# Patient Record
Sex: FEMALE | Race: AMERICAN INDIAN OR ALASKA NATIVE | NOT HISPANIC OR LATINO
[De-identification: names, ages, dates, MRNs, and addresses within clinical notes are randomized per-mention and may not be internally consistent; named-entity substitution may affect disease eponyms.]

---

## 2018-07-09 ENCOUNTER — RESULT REVIEW (OUTPATIENT)
Age: 58
End: 2018-07-09

## 2018-07-10 ENCOUNTER — RESULT REVIEW (OUTPATIENT)
Age: 58
End: 2018-07-10

## 2018-08-02 ENCOUNTER — OUTPATIENT (OUTPATIENT)
Dept: OUTPATIENT SERVICES | Facility: HOSPITAL | Age: 58
LOS: 1 days | End: 2018-08-02

## 2018-08-02 ENCOUNTER — APPOINTMENT (OUTPATIENT)
Dept: OBGYN | Facility: HOSPITAL | Age: 58
End: 2018-08-02

## 2018-08-27 ENCOUNTER — INPATIENT (INPATIENT)
Facility: HOSPITAL | Age: 58
LOS: 3 days | Discharge: HOME | End: 2018-08-31
Attending: INTERNAL MEDICINE | Admitting: INTERNAL MEDICINE
Payer: MEDICAID

## 2018-08-27 ENCOUNTER — APPOINTMENT (OUTPATIENT)
Dept: MRI IMAGING | Facility: IMAGING CENTER | Age: 58
End: 2018-08-27

## 2018-08-27 VITALS
SYSTOLIC BLOOD PRESSURE: 100 MMHG | TEMPERATURE: 101 F | HEART RATE: 114 BPM | RESPIRATION RATE: 26 BRPM | OXYGEN SATURATION: 96 % | DIASTOLIC BLOOD PRESSURE: 68 MMHG

## 2018-08-27 DIAGNOSIS — A41.9 SEPSIS, UNSPECIFIED ORGANISM: ICD-10-CM

## 2018-08-27 DIAGNOSIS — N17.9 ACUTE KIDNEY FAILURE, UNSPECIFIED: ICD-10-CM

## 2018-08-27 DIAGNOSIS — E03.9 HYPOTHYROIDISM, UNSPECIFIED: ICD-10-CM

## 2018-08-27 DIAGNOSIS — Z29.9 ENCOUNTER FOR PROPHYLACTIC MEASURES, UNSPECIFIED: ICD-10-CM

## 2018-08-27 DIAGNOSIS — I10 ESSENTIAL (PRIMARY) HYPERTENSION: ICD-10-CM

## 2018-08-27 DIAGNOSIS — N12 TUBULO-INTERSTITIAL NEPHRITIS, NOT SPECIFIED AS ACUTE OR CHRONIC: ICD-10-CM

## 2018-08-27 DIAGNOSIS — N63.0 UNSPECIFIED LUMP IN UNSPECIFIED BREAST: ICD-10-CM

## 2018-08-27 DIAGNOSIS — E87.6 HYPOKALEMIA: ICD-10-CM

## 2018-08-27 DIAGNOSIS — M06.9 RHEUMATOID ARTHRITIS, UNSPECIFIED: ICD-10-CM

## 2018-08-27 LAB
ALBUMIN SERPL ELPH-MCNC: 3.4 G/DL — SIGNIFICANT CHANGE UP (ref 3.3–5)
ALP SERPL-CCNC: 95 U/L — SIGNIFICANT CHANGE UP (ref 40–120)
ALT FLD-CCNC: 23 U/L — SIGNIFICANT CHANGE UP (ref 4–33)
ANISOCYTOSIS BLD QL: SLIGHT — SIGNIFICANT CHANGE UP
APPEARANCE UR: SIGNIFICANT CHANGE UP
AST SERPL-CCNC: 55 U/L — HIGH (ref 4–32)
BACTERIA # UR AUTO: NEGATIVE — SIGNIFICANT CHANGE UP
BASE EXCESS BLDV CALC-SCNC: -0.7 MMOL/L — SIGNIFICANT CHANGE UP
BASE EXCESS BLDV CALC-SCNC: -1.6 MMOL/L — SIGNIFICANT CHANGE UP
BASOPHILS # BLD AUTO: 0.06 K/UL — SIGNIFICANT CHANGE UP (ref 0–0.2)
BASOPHILS NFR BLD AUTO: 0.3 % — SIGNIFICANT CHANGE UP (ref 0–2)
BASOPHILS NFR SPEC: 0 % — SIGNIFICANT CHANGE UP (ref 0–2)
BILIRUB SERPL-MCNC: 1.7 MG/DL — HIGH (ref 0.2–1.2)
BILIRUB UR-MCNC: NEGATIVE — SIGNIFICANT CHANGE UP
BLASTS # FLD: 0 % — SIGNIFICANT CHANGE UP (ref 0–0)
BLOOD GAS VENOUS - CREATININE: 2.83 MG/DL — HIGH (ref 0.5–1.3)
BLOOD GAS VENOUS - CREATININE: 2.93 MG/DL — HIGH (ref 0.5–1.3)
BLOOD UR QL VISUAL: HIGH
BUN SERPL-MCNC: 37 MG/DL — HIGH (ref 7–23)
BURR CELLS BLD QL SMEAR: SLIGHT — SIGNIFICANT CHANGE UP
CALCIUM SERPL-MCNC: 8.6 MG/DL — SIGNIFICANT CHANGE UP (ref 8.4–10.5)
CHLORIDE BLDV-SCNC: 103 MMOL/L — SIGNIFICANT CHANGE UP (ref 96–108)
CHLORIDE BLDV-SCNC: 98 MMOL/L — SIGNIFICANT CHANGE UP (ref 96–108)
CHLORIDE SERPL-SCNC: 88 MMOL/L — LOW (ref 98–107)
CO2 SERPL-SCNC: 21 MMOL/L — LOW (ref 22–31)
COLOR SPEC: YELLOW — SIGNIFICANT CHANGE UP
CREAT SERPL-MCNC: 2.79 MG/DL — HIGH (ref 0.5–1.3)
EOSINOPHIL # BLD AUTO: 0.01 K/UL — SIGNIFICANT CHANGE UP (ref 0–0.5)
EOSINOPHIL NFR BLD AUTO: 0 % — SIGNIFICANT CHANGE UP (ref 0–6)
EOSINOPHIL NFR FLD: 0.8 % — SIGNIFICANT CHANGE UP (ref 0–6)
GAS PNL BLDV: 127 MMOL/L — LOW (ref 136–146)
GAS PNL BLDV: 129 MMOL/L — LOW (ref 136–146)
GIANT PLATELETS BLD QL SMEAR: PRESENT — SIGNIFICANT CHANGE UP
GLUCOSE BLDV-MCNC: 115 — HIGH (ref 70–99)
GLUCOSE BLDV-MCNC: 122 — HIGH (ref 70–99)
GLUCOSE SERPL-MCNC: 111 MG/DL — HIGH (ref 70–99)
GLUCOSE UR-MCNC: NEGATIVE — SIGNIFICANT CHANGE UP
HCO3 BLDV-SCNC: 22 MMOL/L — SIGNIFICANT CHANGE UP (ref 20–27)
HCO3 BLDV-SCNC: 23 MMOL/L — SIGNIFICANT CHANGE UP (ref 20–27)
HCT VFR BLD CALC: 37.5 % — SIGNIFICANT CHANGE UP (ref 34.5–45)
HCT VFR BLDV CALC: 32.4 % — LOW (ref 34.5–45)
HCT VFR BLDV CALC: 39.2 % — SIGNIFICANT CHANGE UP (ref 34.5–45)
HGB BLD-MCNC: 12.8 G/DL — SIGNIFICANT CHANGE UP (ref 11.5–15.5)
HGB BLDV-MCNC: 10.5 G/DL — LOW (ref 11.5–15.5)
HGB BLDV-MCNC: 12.8 G/DL — SIGNIFICANT CHANGE UP (ref 11.5–15.5)
HYALINE CASTS # UR AUTO: SIGNIFICANT CHANGE UP
IMM GRANULOCYTES # BLD AUTO: 0.2 # — SIGNIFICANT CHANGE UP
IMM GRANULOCYTES NFR BLD AUTO: 0.9 % — SIGNIFICANT CHANGE UP (ref 0–1.5)
KETONES UR-MCNC: NEGATIVE — SIGNIFICANT CHANGE UP
LACTATE BLDV-MCNC: 1.9 MMOL/L — SIGNIFICANT CHANGE UP (ref 0.5–2)
LACTATE BLDV-MCNC: 2.4 MMOL/L — HIGH (ref 0.5–2)
LEUKOCYTE ESTERASE UR-ACNC: SIGNIFICANT CHANGE UP
LYMPHOCYTES # BLD AUTO: 1.14 K/UL — SIGNIFICANT CHANGE UP (ref 1–3.3)
LYMPHOCYTES # BLD AUTO: 4.9 % — LOW (ref 13–44)
LYMPHOCYTES NFR SPEC AUTO: 2.6 % — LOW (ref 13–44)
MCHC RBC-ENTMCNC: 30.6 PG — SIGNIFICANT CHANGE UP (ref 27–34)
MCHC RBC-ENTMCNC: 34.1 % — SIGNIFICANT CHANGE UP (ref 32–36)
MCV RBC AUTO: 89.7 FL — SIGNIFICANT CHANGE UP (ref 80–100)
METAMYELOCYTES # FLD: 0 % — SIGNIFICANT CHANGE UP (ref 0–1)
MONOCYTES # BLD AUTO: 0.66 K/UL — SIGNIFICANT CHANGE UP (ref 0–0.9)
MONOCYTES NFR BLD AUTO: 2.8 % — SIGNIFICANT CHANGE UP (ref 2–14)
MONOCYTES NFR BLD: 2.6 % — SIGNIFICANT CHANGE UP (ref 2–9)
MYELOCYTES NFR BLD: 0 % — SIGNIFICANT CHANGE UP (ref 0–0)
NEUTROPHIL AB SER-ACNC: 88.9 % — HIGH (ref 43–77)
NEUTROPHILS # BLD AUTO: 21.1 K/UL — HIGH (ref 1.8–7.4)
NEUTROPHILS NFR BLD AUTO: 91.1 % — HIGH (ref 43–77)
NEUTS BAND # BLD: 5.1 % — SIGNIFICANT CHANGE UP (ref 0–6)
NITRITE UR-MCNC: NEGATIVE — SIGNIFICANT CHANGE UP
NRBC # FLD: 0 — SIGNIFICANT CHANGE UP
OTHER - HEMATOLOGY %: 0 — SIGNIFICANT CHANGE UP
PCO2 BLDV: 37 MMHG — LOW (ref 41–51)
PCO2 BLDV: 39 MMHG — LOW (ref 41–51)
PH BLDV: 7.38 PH — SIGNIFICANT CHANGE UP (ref 7.32–7.43)
PH BLDV: 7.41 PH — SIGNIFICANT CHANGE UP (ref 7.32–7.43)
PH UR: 6 — SIGNIFICANT CHANGE UP (ref 5–8)
PLATELET # BLD AUTO: 170 K/UL — SIGNIFICANT CHANGE UP (ref 150–400)
PLATELET COUNT - ESTIMATE: NORMAL — SIGNIFICANT CHANGE UP
PMV BLD: 10.8 FL — SIGNIFICANT CHANGE UP (ref 7–13)
PO2 BLDV: 19 MMHG — LOW (ref 35–40)
PO2 BLDV: 31 MMHG — LOW (ref 35–40)
POIKILOCYTOSIS BLD QL AUTO: SLIGHT — SIGNIFICANT CHANGE UP
POTASSIUM BLDV-SCNC: 2.6 MMOL/L — CRITICAL LOW (ref 3.4–4.5)
POTASSIUM BLDV-SCNC: 3.2 MMOL/L — LOW (ref 3.4–4.5)
POTASSIUM SERPL-MCNC: 3.5 MMOL/L — SIGNIFICANT CHANGE UP (ref 3.5–5.3)
POTASSIUM SERPL-SCNC: 3.5 MMOL/L — SIGNIFICANT CHANGE UP (ref 3.5–5.3)
PROMYELOCYTES # FLD: 0 % — SIGNIFICANT CHANGE UP (ref 0–0)
PROT SERPL-MCNC: 7.3 G/DL — SIGNIFICANT CHANGE UP (ref 6–8.3)
PROT UR-MCNC: HIGH
RBC # BLD: 4.18 M/UL — SIGNIFICANT CHANGE UP (ref 3.8–5.2)
RBC # FLD: 13.6 % — SIGNIFICANT CHANGE UP (ref 10.3–14.5)
RBC CASTS # UR COMP ASSIST: SIGNIFICANT CHANGE UP (ref 0–?)
REVIEW TO FOLLOW: YES — SIGNIFICANT CHANGE UP
SAO2 % BLDV: 25.5 % — LOW (ref 60–85)
SAO2 % BLDV: 51.6 % — LOW (ref 60–85)
SODIUM SERPL-SCNC: 131 MMOL/L — LOW (ref 135–145)
SP GR SPEC: 1.02 — SIGNIFICANT CHANGE UP (ref 1–1.04)
SQUAMOUS # UR AUTO: SIGNIFICANT CHANGE UP
TSH SERPL-MCNC: 4.06 UIU/ML — SIGNIFICANT CHANGE UP (ref 0.27–4.2)
UROBILINOGEN FLD QL: NORMAL — SIGNIFICANT CHANGE UP
VARIANT LYMPHS # BLD: 0 % — SIGNIFICANT CHANGE UP
WBC # BLD: 23.17 K/UL — HIGH (ref 3.8–10.5)
WBC # FLD AUTO: 23.17 K/UL — HIGH (ref 3.8–10.5)
WBC UR QL: >50 — HIGH (ref 0–?)

## 2018-08-27 PROCEDURE — 71045 X-RAY EXAM CHEST 1 VIEW: CPT | Mod: 26

## 2018-08-27 PROCEDURE — 74176 CT ABD & PELVIS W/O CONTRAST: CPT | Mod: 26

## 2018-08-27 PROCEDURE — 99223 1ST HOSP IP/OBS HIGH 75: CPT | Mod: GC

## 2018-08-27 RX ORDER — POTASSIUM CHLORIDE 20 MEQ
40 PACKET (EA) ORAL ONCE
Qty: 0 | Refills: 0 | Status: COMPLETED | OUTPATIENT
Start: 2018-08-27 | End: 2018-08-27

## 2018-08-27 RX ORDER — LOSARTAN POTASSIUM 100 MG/1
50 TABLET, FILM COATED ORAL DAILY
Qty: 0 | Refills: 0 | Status: DISCONTINUED | OUTPATIENT
Start: 2018-08-27 | End: 2018-08-27

## 2018-08-27 RX ORDER — LEVOTHYROXINE SODIUM 125 MCG
25 TABLET ORAL DAILY
Qty: 0 | Refills: 0 | Status: DISCONTINUED | OUTPATIENT
Start: 2018-08-27 | End: 2018-08-31

## 2018-08-27 RX ORDER — ERGOCALCIFEROL 1.25 MG/1
50000 CAPSULE ORAL
Qty: 0 | Refills: 0 | Status: DISCONTINUED | OUTPATIENT
Start: 2018-08-27 | End: 2018-08-28

## 2018-08-27 RX ORDER — SODIUM CHLORIDE 9 MG/ML
1000 INJECTION, SOLUTION INTRAVENOUS ONCE
Qty: 0 | Refills: 0 | Status: COMPLETED | OUTPATIENT
Start: 2018-08-27 | End: 2018-08-27

## 2018-08-27 RX ORDER — LEVOTHYROXINE SODIUM 125 MCG
1 TABLET ORAL
Qty: 0 | Refills: 0 | COMMUNITY

## 2018-08-27 RX ORDER — SODIUM CHLORIDE 9 MG/ML
1000 INJECTION INTRAMUSCULAR; INTRAVENOUS; SUBCUTANEOUS
Qty: 0 | Refills: 0 | Status: DISCONTINUED | OUTPATIENT
Start: 2018-08-27 | End: 2018-08-31

## 2018-08-27 RX ORDER — HEPARIN SODIUM 5000 [USP'U]/ML
5000 INJECTION INTRAVENOUS; SUBCUTANEOUS EVERY 8 HOURS
Qty: 0 | Refills: 0 | Status: DISCONTINUED | OUTPATIENT
Start: 2018-08-27 | End: 2018-08-31

## 2018-08-27 RX ORDER — ACETAMINOPHEN 500 MG
975 TABLET ORAL ONCE
Qty: 0 | Refills: 0 | Status: COMPLETED | OUTPATIENT
Start: 2018-08-27 | End: 2018-08-27

## 2018-08-27 RX ORDER — VANCOMYCIN HCL 1 G
1000 VIAL (EA) INTRAVENOUS ONCE
Qty: 0 | Refills: 0 | Status: COMPLETED | OUTPATIENT
Start: 2018-08-27 | End: 2018-08-27

## 2018-08-27 RX ORDER — ACETAMINOPHEN 500 MG
650 TABLET ORAL ONCE
Qty: 0 | Refills: 0 | Status: COMPLETED | OUTPATIENT
Start: 2018-08-27 | End: 2018-08-27

## 2018-08-27 RX ORDER — ACETAMINOPHEN 500 MG
650 TABLET ORAL EVERY 6 HOURS
Qty: 0 | Refills: 0 | Status: DISCONTINUED | OUTPATIENT
Start: 2018-08-27 | End: 2018-08-31

## 2018-08-27 RX ORDER — PIPERACILLIN AND TAZOBACTAM 4; .5 G/20ML; G/20ML
3.38 INJECTION, POWDER, LYOPHILIZED, FOR SOLUTION INTRAVENOUS ONCE
Qty: 0 | Refills: 0 | Status: COMPLETED | OUTPATIENT
Start: 2018-08-27 | End: 2018-08-27

## 2018-08-27 RX ORDER — SODIUM CHLORIDE 9 MG/ML
1000 INJECTION INTRAMUSCULAR; INTRAVENOUS; SUBCUTANEOUS ONCE
Qty: 0 | Refills: 0 | Status: COMPLETED | OUTPATIENT
Start: 2018-08-27 | End: 2018-08-27

## 2018-08-27 RX ORDER — POTASSIUM CHLORIDE 20 MEQ
10 PACKET (EA) ORAL ONCE
Qty: 0 | Refills: 0 | Status: COMPLETED | OUTPATIENT
Start: 2018-08-27 | End: 2018-08-27

## 2018-08-27 RX ORDER — HYDROCHLOROTHIAZIDE 25 MG
12.5 TABLET ORAL DAILY
Qty: 0 | Refills: 0 | Status: DISCONTINUED | OUTPATIENT
Start: 2018-08-27 | End: 2018-08-27

## 2018-08-27 RX ORDER — ERGOCALCIFEROL 1.25 MG/1
1 CAPSULE ORAL
Qty: 0 | Refills: 0 | COMMUNITY

## 2018-08-27 RX ORDER — IPRATROPIUM/ALBUTEROL SULFATE 18-103MCG
3 AEROSOL WITH ADAPTER (GRAM) INHALATION ONCE
Qty: 0 | Refills: 0 | Status: COMPLETED | OUTPATIENT
Start: 2018-08-27 | End: 2018-08-27

## 2018-08-27 RX ADMIN — Medication 100 MILLIEQUIVALENT(S): at 21:01

## 2018-08-27 RX ADMIN — SODIUM CHLORIDE 1000 MILLILITER(S): 9 INJECTION INTRAMUSCULAR; INTRAVENOUS; SUBCUTANEOUS at 14:30

## 2018-08-27 RX ADMIN — Medication 650 MILLIGRAM(S): at 21:01

## 2018-08-27 RX ADMIN — HEPARIN SODIUM 5000 UNIT(S): 5000 INJECTION INTRAVENOUS; SUBCUTANEOUS at 23:39

## 2018-08-27 RX ADMIN — Medication 40 MILLIEQUIVALENT(S): at 21:01

## 2018-08-27 RX ADMIN — Medication 3 MILLILITER(S): at 14:30

## 2018-08-27 RX ADMIN — SODIUM CHLORIDE 1000 MILLILITER(S): 9 INJECTION, SOLUTION INTRAVENOUS at 19:02

## 2018-08-27 RX ADMIN — PIPERACILLIN AND TAZOBACTAM 3.38 GRAM(S): 4; .5 INJECTION, POWDER, LYOPHILIZED, FOR SOLUTION INTRAVENOUS at 15:00

## 2018-08-27 RX ADMIN — Medication 1000 MILLIGRAM(S): at 16:20

## 2018-08-27 RX ADMIN — Medication 650 MILLIGRAM(S): at 22:02

## 2018-08-27 RX ADMIN — PIPERACILLIN AND TAZOBACTAM 200 GRAM(S): 4; .5 INJECTION, POWDER, LYOPHILIZED, FOR SOLUTION INTRAVENOUS at 14:30

## 2018-08-27 RX ADMIN — SODIUM CHLORIDE 1000 MILLILITER(S): 9 INJECTION INTRAMUSCULAR; INTRAVENOUS; SUBCUTANEOUS at 15:47

## 2018-08-27 RX ADMIN — Medication 975 MILLIGRAM(S): at 15:20

## 2018-08-27 RX ADMIN — SODIUM CHLORIDE 100 MILLILITER(S): 9 INJECTION INTRAMUSCULAR; INTRAVENOUS; SUBCUTANEOUS at 19:01

## 2018-08-27 RX ADMIN — SODIUM CHLORIDE 1000 MILLILITER(S): 9 INJECTION, SOLUTION INTRAVENOUS at 15:47

## 2018-08-27 RX ADMIN — Medication 250 MILLIGRAM(S): at 15:20

## 2018-08-27 NOTE — H&P ADULT - PROBLEM SELECTOR PLAN 1
tachypneic, tachycardic, leukocytosis, febrile, pyelo as source.   -s/p Vanc/Zosyn and fluids in the ED  -U/A positive, CT suspicious of pyelo   -will start Ceftriaxone  -f/u urine and blood clxs  -c/w maintenance fluids @ 100cc/hr tachypneic, tachycardic, leukocytosis, febrile, pyelo as source.   -s/p Vanc/Zosyn and fluids in the ED  -U/A positive, CT suspicious of pyelo   -c/w Zosyn renal dosing for now, may need to switch if Cr improves  -f/u urine and blood clxs  -c/w maintenance fluids @ 100cc/hr tachypneic, tachycardic, leukocytosis, febrile, CT A/P suspicious for Rt pyelonephritis  -s/p Vanc/Zosyn and fluids in the ED  -U/A positive, CT suspicious of pyelo   -c/w Zosyn renal dosing for now, may need increase dosage once renal function improves  -f/u urine and blood clxs  -c/w maintenance fluids @ 100cc/hr

## 2018-08-27 NOTE — H&P ADULT - PROBLEM SELECTOR PLAN 8
Pt seen by Dr. Marlena Rubi for follow up. Pt reports that she was getting MRI done today, but could not complete the study today.  -call Dr. Rubi and see if she can get MRI done as inpt to facilitate treatment once she leaves hospital -repeating electrolytes  -f/u on BMPs  -check Magnesium

## 2018-08-27 NOTE — H&P ADULT - PROBLEM SELECTOR PLAN 5
-will continue Synthroid 25mcg QD home dose  -TSH in AM pt takes Methotrexate once a week as outpt.  -will hold Methotrexate while pt hospitalized pt takes Methotrexate once a week as outpt.  -will hold Methotrexate while pt hospitalized  -Clarify the pt home medications, especially the dose of Methotrexate in AM (primary team)

## 2018-08-27 NOTE — ED PROVIDER NOTE - PROGRESS NOTE DETAILS
MILLY Llanes: Spoke with pts PMD , pt to be admitted to the hospitalist. Discussed admission with patient who agrees. Pt was at breast imaging center this morning, she already knows of left breast mass. MILLY Llanes: Spoke with hospitalist  who accepts this pt to her service, MAR text paged

## 2018-08-27 NOTE — H&P ADULT - NSHPPHYSICALEXAM_GEN_ALL_CORE
T(C): 37.2 (08-27-18 @ 17:53), Max: 38.4 (08-27-18 @ 13:48)  HR: 74 (08-27-18 @ 19:01) (74 - 114)  BP: 128/71 (08-27-18 @ 19:01) (93/39 - 128/71)  RR: 18 (08-27-18 @ 19:01) (17 - 26)  SpO2: 98% (08-27-18 @ 19:01) (96% - 98%)    GENERAL: NAD, well-developed  HEAD:  Atraumatic, Normocephalic  EYES: slightly icteric eyes, otherwise normal  NECK: Supple, No JVD  CHEST/LUNG: Clear to auscultation bilaterally; No wheeze  HEART: Regular rate and rhythm; No murmurs, rubs, or gallops  ABDOMEN: R CVA tenderness. tenderness to deep palpation of R abd. pain on deep palpation of epigastric area. bowel sounds positive  EXTREMITIES:  2+ Peripheral Pulses, No clubbing, cyanosis, or edema  PSYCH: AAOx3  NEUROLOGY: non-focal  SKIN: No rashes or lesions T(C): 37.2 (08-27-18 @ 17:53), Max: 38.4 (08-27-18 @ 13:48)  HR: 74 (08-27-18 @ 19:01) (74 - 114)  BP: 128/71 (08-27-18 @ 19:01) (93/39 - 128/71)  RR: 18 (08-27-18 @ 19:01) (17 - 26)  SpO2: 98% (08-27-18 @ 19:01) (96% - 98%)    GENERAL: NAD, well-developed  HEAD:  Atraumatic, Normocephalic  NECK: Supple, No JVD  CHEST/LUNG: Clear to auscultation bilaterally; No wheeze  HEART: Regular rate and rhythm; No murmurs, rubs, or gallops  ABDOMEN: R CVA tenderness to perc. tenderness to deep palpation of Rt upper abd. qdrnt, pain on deep palpation of epigastric area. bowel sounds positive  EXTREMITIES:  2+ Peripheral Pulses, No clubbing, cyanosis, or edema  SKIN: No rashes or lesions

## 2018-08-27 NOTE — H&P ADULT - PROBLEM SELECTOR PLAN 2
-infectious treatment as above  -will consider switching to oral agent once uclx return CT  A/P suspicious for pyelonephritis  -infectious treatment as above  -will consider switching to oral agent once uclx return

## 2018-08-27 NOTE — H&P ADULT - NSHPLABSRESULTS_GEN_ALL_CORE
12.8   23.17 )-----------( 170      ( 27 Aug 2018 14:26 )             37.5           131<L>  |  88<L>  |  37<H>  ----------------------------<  111<H>  3.5   |  21<L>  |  2.79<H>    Ca    8.6      27 Aug 2018 14:26    TPro  7.3  /  Alb  3.4  /  TBili  1.7<H>  /  DBili  x   /  AST  55<H>  /  ALT  23  /  AlkPhos  95                Urinalysis Basic - ( 27 Aug 2018 16:30 )    Color: YELLOW / Appearance: Lt TURBID / S.018 / pH: 6.0  Gluc: NEGATIVE / Ketone: NEGATIVE  / Bili: NEGATIVE / Urobili: NORMAL   Blood: MODERATE / Protein: MODERATE / Nitrite: NEGATIVE   Leuk Esterase: LARGE / RBC: 3-5 / WBC >50   Sq Epi: FEW / Non Sq Epi: x / Bacteria: NEGATIVE    Lactate Trend  Blood Gas Venous - Lactate: 2.4: Please note updated reference range. mmol/L (18 @ 14:26)  Blood Gas Venous - Lactate: 1.9: Please note updated reference range. mmol/L (18 @ 19:00)    < from: CT Abdomen and Pelvis No Cont (18 @ 16:03) >    IMPRESSION:   Enlarged heterogeneous appearing right kidney with urothelial thickening   and mild perinephric stranding, suspicious for pyelonephritis. Underlying   infiltrative process cannot be excluded. No obstructing urinary calculi   bilaterally.    Moderate age indeterminate compression deformity of L1 is new from   2016.    Solid spiculated mass in the lateral left breast, suspicious for   neoplasm. Correlation with mammography is suggested.    < end of copied text >    < from: Xray Chest 1 View-PORTABLE IMMEDIATE (18 @ 16:00) >    IMPRESSION:  Lungs underinflated but otherwise clear. No pleural effusions or   pneumothorax.    Stable cardiac and mediastinal silhouettes.    Trachea projects to left of midline but proportionate to degree of   patient rotation.    Severe right shoulder degenerative disease with exuberant surrounding   increased ossification and advanced left shoulder degenerative disease   again noted.    < end of copied text > CT ABDOMEN AND PELVIS    PROCEDURE DATE:  Aug 27 2018   LOWER CHEST: Bibasilar subsegmental atelectasis. A solid spiculated mass   in the lateral left breast measures 2.7 x 1.9 cm (2:7) and has an   associated coarse calcification.  LIVER: Within normal limits.  BILE DUCTS: Normal caliber.  GALLBLADDER: Within normal limits.  SPLEEN: Within normal limits.  PANCREAS: Within normal limits.  ADRENALS: Within normal limits.  KIDNEYS/URETERS: Enlarged/edematous heterogeneous appearing right kidney   with perinephric/periureteral fat stranding and hyperdensity of the right   ureteral wall. No obstructing right ureteral calculus. No left   hydronephrosis.   BLADDER: Within normal limits.  REPRODUCTIVE ORGANS: No adnexal masses. Status post hysterectomy.  BOWEL: No bowel obstruction.   PERITONEUM: No ascites.  VESSELS:  Within normal limits.  RETROPERITONEUM: No lymphadenopathy.    ABDOMINAL WALL: Within normal limits.  BONES: Moderate age indeterminate compression deformity of L1 is new from   8/8/2016. No bony retropulsion. Degenerative changes.  IMPRESSION:   Enlarged heterogeneous appearing right kidney with urothelial thickening   and mild perinephric stranding, suspicious for pyelonephritis. Underlying   infiltrative process cannot be excluded. No obstructing urinary calculi   bilaterally.    Moderate age indeterminate compression deformity of L1 is new from   8/8/2016.    Solid spiculated mass in the lateral left breast, suspicious for   neoplasm. Correlation with mammography is suggested.

## 2018-08-27 NOTE — ED ADULT NURSE NOTE - NSIMPLEMENTINTERV_GEN_ALL_ED
Implemented All Universal Safety Interventions:  [place] to call system. Call bell, personal items and telephone within reach. Instruct patient to call for assistance. Room bathroom lighting operational. Non-slip footwear when patient is off stretcher. Physically safe environment: no spills, clutter or unnecessary equipment. Stretcher in lowest position, wheels locked, appropriate side rails in place.

## 2018-08-27 NOTE — H&P ADULT - LYMPHATIC
anterior cervical R/supraclavicular L/posterior cervical L/anterior cervical L/posterior cervical R/supraclavicular R

## 2018-08-27 NOTE — ED PROVIDER NOTE - ATTENDING CONTRIBUTION TO CARE
Dr. Wilks:  I performed a face to face bedside interview with patient regarding history of present illness, review of symptoms and past medical history. I completed an independent physical exam.  I have discussed patient's plan of care with PA.   I agree with note as stated above, having amended the EMR as needed to reflect my findings.   This includes HISTORY OF PRESENT ILLNESS, HIV, PAST MEDICAL/SURGICAL/FAMILY/SOCIAL HISTORY, ALLERGIES AND HOME MEDICATIONS, REVIEW OF SYSTEMS, PHYSICAL EXAM, and any PROGRESS NOTES during the time I functioned as the attending physician for this patient.    58F h/o RA on methotrexate, HTN, hypothyroidism, presents with 2 days of fever with low back pain, abd pain, dysuria and urinary frequency.  Also c/o CP/SOB last night.  Was started on antibiotics yesterday by PCP but not sure which and why.  Returned to see PCP today and sent to ED for low BP and fever.  Reportedly BP 70's systolic.  Endorses Nausea.      Exam:  - appears uncomfortable  - rrr  - ctab  - abd soft, non tender, +r flank mild tenderness    A/P  - Dr. Wilks:  I performed a face to face bedside interview with patient regarding history of present illness, review of symptoms and past medical history. I completed an independent physical exam.  I have discussed patient's plan of care with PA.   I agree with note as stated above, having amended the EMR as needed to reflect my findings.   This includes HISTORY OF PRESENT ILLNESS, HIV, PAST MEDICAL/SURGICAL/FAMILY/SOCIAL HISTORY, ALLERGIES AND HOME MEDICATIONS, REVIEW OF SYSTEMS, PHYSICAL EXAM, and any PROGRESS NOTES during the time I functioned as the attending physician for this patient.    58F h/o RA on methotrexate, HTN, hypothyroidism, presents with 2 days of fever with low back pain, abd pain, dysuria and urinary frequency.  Also c/o CP/SOB last night.  Was started on antibiotics yesterday by PCP but not sure which and why.  Returned to see PCP today and sent to ED for low BP and fever.  Reportedly BP 70's systolic.  Endorses Nausea.      Exam:  - appears uncomfortable  - rrr  - ctab  - abd soft, ?TTP R abdomen, +r flank mild tenderness    A/P  - sepsis, eval source  - cbc, cmp, vbg, coags, ua, urine culture, blood cultures  - ct abd/pelvis  - CXR

## 2018-08-27 NOTE — H&P ADULT - PROBLEM SELECTOR PLAN 3
Pt takes Telmisartan and Chlorthalidone at home  - will hold antihypertensives for now as pt was hypotensive in the ED CT A/P significant for LIVER: Within normal limits. BILE DUCTS: Normal caliber.  GALLBLADDER: Within normal limits.  -Likely symptoms present due to Rt kidney infection rather than gallbladder problem given CT findings   -Monitor

## 2018-08-27 NOTE — ED PROVIDER NOTE - MEDICAL DECISION MAKING DETAILS
58yF w/pmhx HTN, RA on methotrexate, hypothyroid p/w fever, abd pain, low back pain and dysuria x 2 days. Sent in by her PMD, had been started on abx yesterday? Pt tachy and febrile. Will give fluids and abx, cbc/cmp/vbg, ua and culture, chest xray and CT abd/pelvis.

## 2018-08-27 NOTE — ED PROVIDER NOTE - OBJECTIVE STATEMENT
58yF w/pmhx RA on methotrexate, HTN, hypothyroid presenting with fever, abdominal pain and dysuria x 2 days. Pts daughter is translating at bedside per pts request. Pt states she had urinary frequency and dysuria followed by low back pain, abdominal pain and fever that started 2 days ago. Pt endorses nausea, no vomiting or diarrhea. She saw her PMD yesterday who started her on antibiotics (for unknown reason). Last night she developed chest pain with shortness of breath. Today she was at her doctors office and was sent to the ED with fever for further evaluation. EMS gave pt 2 duonebs, her pressure was 72/58. Pt denies palpitations, vomiting, diarrhea, sick contacts, recent travel, numbness/tingling, bowel or bladder incontinence or any other concerns.

## 2018-08-27 NOTE — ED CLERICAL - NS ED CLERK NOTE PRE-ARRIVAL INFORMATION; PCP CONTACT INFORMATION
pt was at [facility] for a breast biopsy but was found to have a temp of 103 BP 98 systolic O2 sat 92% creat 3

## 2018-08-27 NOTE — H&P ADULT - NSHPSOCIALHISTORY_GEN_ALL_CORE
pt retired, used to work as head of a school. lives at home with family.  denies toxic habits pt retired, used to work as head of a school.  lives at home with family.  reports no tobacco, alcohol ir illicit drug use

## 2018-08-27 NOTE — H&P ADULT - PROBLEM SELECTOR PLAN 4
pt takes Methotrexate once a week as outpt.  -will hold Methotrexate while pt hospitalized Pt takes Telmisartan and Chlorthalidone at home  - will hold antihypertensives for now as pt was hypotensive in the ED

## 2018-08-27 NOTE — H&P ADULT - HISTORY OF PRESENT ILLNESS
58F pmh RA on Methotrexate, HTN, Hypothyroidism, L breast lump being worked up presents to ED w/ 3 day history of pain on urination, R flank pain, fevers, chills. Pt's daughter translated at bedside as pt refused  services. Pt reports that as of 3 days ago, she has been feeling ill. She started with having burning on urination along with chills and subjective fevers. Pt reports that the next day, she started to feel back pain, squeezing in nature, 7/10, better with rest, worse with movement, that radiates to the R lower abdomen. Pt went to PCP's office yesterday who prescribed an abx and gave the pt B12. On day of admission, pt was not feeling much better, but went to an MRI she had scheduled for a workup of the L breast lump. At radiology, pt was deemed to be too ill to undergo the MRI and she was taken to the ED. Pt reports only slight nausea that resolved on day of admission along with some diarrhea after taking the first couple of doses of ab prescribed by PCP. Pt denies chest pain, SOB, vomiting, palpitations, lightheadedness, weakness, night sweats.     In the ED:   Vitals:101.2, 114, 93/39->128/71, RR 28->18, 98%RA  Notable Labs and Imaging: WBC 23.17, Lactate 2.4->1.9  Given: Vanc/Zosyn, Duonebs, 1L LR, 1L NS, Tylenol, Potassium 59yo Female, ambulatory without assistive devices, h/o RA on Methotrexate, HTN, Hypothyroidism, L breast lump being worked up presents to ED w/ 3 day history of pain on urination, R flank pain, fevers, chills. Pt's daughter translated at bedside as pt refused  services. Pt reports that as of 3 days ago, she has been feeling ill. She started with having burning on urination along with chills and subjective fevers. Pt reports that the next day, she started to feel back pain, squeezing in nature, 7/10, better with rest, worse with movement, that radiates to the R lower abdomen. Pt went to PCP's office yesterday who prescribed an abx and gave the pt B12. On day of admission, pt was not feeling much better, but went to an MRI she had scheduled for a workup of the Lt breast mass. At radiology, pt was deemed to be too ill to undergo the MRI and she was taken to the ED. Pt reports only slight nausea that resolved on day of admission along with some diarrhea after taking the first couple of doses of ab prescribed by PCP. Pt denies chest pain, SOB, vomiting, palpitations, lightheadedness, weakness, night sweats.     In the ED:   Vitals:101.2, 114, 93/39->128/71, RR 28->18, 98%RA  Notable Labs and Imaging: WBC 23.17, Lactate 2.4->1.9  Given: Vanc/Zosyn, Duonebs, 1L LR, 1L NS, Tylenol, Potassium 59yo Female, ambulatory without assistive devices, h/o RA on Methotrexate, HTN, Hypothyroidism, L breast mass being worked up presents to ED w/ 3 day history of pain on urination, R flank pain, fevers, chills. Pt's daughter translated at bedside as pt refused  services. Pt reports that as of 3 days ago, she has been feeling ill. She started with having burning on urination along with chills and subjective fevers. Pt reports that the next day, she started to feel back pain, squeezing in nature, 7/10, better with rest, worse with movement, that radiates to the R lower abdomen. Pt went to PCP's office yesterday who prescribed an abx and gave the pt B12. On day of admission, pt was not feeling much better, but went to an MRI she had scheduled for a workup of the Lt breast mass. At radiology, pt was deemed to be too ill to undergo the MRI and she was taken to the ED. Pt reports only slight nausea that resolved on day of admission along with some diarrhea after taking the first couple of doses of ab prescribed by PCP. Pt denies chest pain, SOB, vomiting, palpitations, lightheadedness, weakness, night sweats.     In the ED:   Vitals:101.2, 114, 93/39->128/71, RR 28->18, 98%RA  Notable Labs and Imaging: WBC 23.17, Lactate 2.4->1.9  Given: Vanc/Zosyn, Duonebs, 1L LR, 1L NS, Tylenol, Potassium

## 2018-08-27 NOTE — H&P ADULT - PROBLEM SELECTOR PLAN 9
VTE: SubQ Hep  Diet: SAJAN Titus [id number]  Internal Medicine Department  Pager: [phone] / [phone] Pt seen by Dr. Marlena Rubi for follow up. Pt reports that she was getting MRI done today, but could not complete the study today.  -Reach out to Dr. Rubi and see if she can get MRI done as inpt to accelerate w/up once she leaves hospital

## 2018-08-27 NOTE — H&P ADULT - NSHPREVIEWOFSYSTEMS_GEN_ALL_CORE
CONSTITUTIONAL: as per HPI  EYES/ENT: No visual changes;  No vertigo or throat pain   NECK: No pain or stiffness  RESPIRATORY: No cough, wheezing, hemoptysis; No shortness of breath  CARDIOVASCULAR: No chest pain or palpitations  GASTROINTESTINAL:  as per HPI  GENITOURINARY: as per HPI  NEUROLOGICAL:  No headache, dizziness, syncope.  MUSCULOSKELETAL:  No muscle, back pain, joint pain or stiffness.  HEMATOLOGIC:  No anemia, bleeding or bruising.  LYMPHATICS:  No enlarged nodes. No history of splenectomy.  PSYCHIATRIC:  No history of depression or anxiety.  ENDOCRINOLOGIC:  No reports of sweating, cold or heat intolerance. No polyuria or polydipsia.  ALLERGIES:  No history of asthma, hives, eczema or rhinitis.

## 2018-08-27 NOTE — H&P ADULT - ASSESSMENT
58F pmh RA on Methotrexate, HTN, Hypothyroidism, L breast lump being admitted for sepsis 2/2 pyelonephritis. 58F pmh RA on Methotrexate, HTN, Hypothyroidism, L breast lump being admitted for sepsis 2/2 pyelonephritis in the context of immunosuppression c/b OLEG and hypokalemia. 59yo Female with h/o RA on Methotrexate, HTN, Hypothyroidism, L breast lump being admitted for sepsis 2/2 Rt pyelonephritis in the context of immunosuppression c/b OLEG and hypokalemia. 57yo Female with h/o RA on Methotrexate, HTN, Hypothyroidism, L breast mass being admitted for sepsis 2/2 Rt pyelonephritis in the context of immunosuppression c/b OLEG, mild hyponatremia and hypokalemia.

## 2018-08-27 NOTE — H&P ADULT - PROBLEM SELECTOR PLAN 6
Unclear baseline, previous Cr at 1.08.  -will give fluids and follow Cr closely -will continue Synthroid 25mcg QD home dose  -TSH in AM

## 2018-08-27 NOTE — ED ADULT NURSE NOTE - CHIEF COMPLAINT QUOTE
Pt present from [address] c/o abdominal pain and back pain. Pt tachypneic, wheezing on upper left lobe, hypotensive in field 72/58 and febrile. 2 duo nebs given.

## 2018-08-27 NOTE — H&P ADULT - PROBLEM SELECTOR PLAN 7
may be in setting of OLEG  -repeating electrolytes  -f/u on BMPs Likely prerenal due to dehydration and Pyelonephritis;   Baseline Cr at 1.08 8/82016  -IVF hydration  -Monitor Renal response Likely prerenal due to dehydration and Pyelonephritis;   Baseline Cr at 1.08 8/82016  -IVF hydration  -Monitor Renal response  -strict I/O  -urine lytes  -Consider Renal c/s

## 2018-08-28 DIAGNOSIS — N63.20 UNSPECIFIED LUMP IN THE LEFT BREAST, UNSPECIFIED QUADRANT: ICD-10-CM

## 2018-08-28 DIAGNOSIS — R10.11 RIGHT UPPER QUADRANT PAIN: ICD-10-CM

## 2018-08-28 LAB
ALBUMIN SERPL ELPH-MCNC: 2.5 G/DL — LOW (ref 3.3–5)
ALP SERPL-CCNC: 78 U/L — SIGNIFICANT CHANGE UP (ref 40–120)
ALT FLD-CCNC: 21 U/L — SIGNIFICANT CHANGE UP (ref 4–33)
APTT BLD: 36.6 SEC — SIGNIFICANT CHANGE UP (ref 27.5–37.4)
AST SERPL-CCNC: 39 U/L — HIGH (ref 4–32)
BASOPHILS # BLD AUTO: 0.02 K/UL — SIGNIFICANT CHANGE UP (ref 0–0.2)
BASOPHILS NFR BLD AUTO: 0.2 % — SIGNIFICANT CHANGE UP (ref 0–2)
BILIRUB SERPL-MCNC: 1.4 MG/DL — HIGH (ref 0.2–1.2)
BUN SERPL-MCNC: 35 MG/DL — HIGH (ref 7–23)
CALCIUM SERPL-MCNC: 7.8 MG/DL — LOW (ref 8.4–10.5)
CHLORIDE SERPL-SCNC: 99 MMOL/L — SIGNIFICANT CHANGE UP (ref 98–107)
CHLORIDE UR-SCNC: 20 MMOL/L — SIGNIFICANT CHANGE UP
CHOLEST SERPL-MCNC: 107 MG/DL — LOW (ref 120–199)
CHOLEST SERPL-MCNC: 114 MG/DL — LOW (ref 120–199)
CO2 SERPL-SCNC: 21 MMOL/L — LOW (ref 22–31)
CREAT SERPL-MCNC: 2.37 MG/DL — HIGH (ref 0.5–1.3)
EOSINOPHIL # BLD AUTO: 0.03 K/UL — SIGNIFICANT CHANGE UP (ref 0–0.5)
EOSINOPHIL NFR BLD AUTO: 0.3 % — SIGNIFICANT CHANGE UP (ref 0–6)
GLUCOSE SERPL-MCNC: 106 MG/DL — HIGH (ref 70–99)
HBA1C BLD-MCNC: 5 % — SIGNIFICANT CHANGE UP (ref 4–5.6)
HCT VFR BLD CALC: 31.8 % — LOW (ref 34.5–45)
HDLC SERPL-MCNC: 19 MG/DL — LOW (ref 45–65)
HDLC SERPL-MCNC: 20 MG/DL — LOW (ref 45–65)
HGB BLD-MCNC: 10.5 G/DL — LOW (ref 11.5–15.5)
IMM GRANULOCYTES # BLD AUTO: 0.04 # — SIGNIFICANT CHANGE UP
IMM GRANULOCYTES NFR BLD AUTO: 0.4 % — SIGNIFICANT CHANGE UP (ref 0–1.5)
INR BLD: 1.26 — HIGH (ref 0.88–1.17)
LIPID PNL WITH DIRECT LDL SERPL: 20 MG/DL — SIGNIFICANT CHANGE UP
LIPID PNL WITH DIRECT LDL SERPL: 20 MG/DL — SIGNIFICANT CHANGE UP
LYMPHOCYTES # BLD AUTO: 0.48 K/UL — LOW (ref 1–3.3)
LYMPHOCYTES # BLD AUTO: 4.8 % — LOW (ref 13–44)
MAGNESIUM SERPL-MCNC: 1.9 MG/DL — SIGNIFICANT CHANGE UP (ref 1.6–2.6)
MCHC RBC-ENTMCNC: 29.2 PG — SIGNIFICANT CHANGE UP (ref 27–34)
MCHC RBC-ENTMCNC: 33 % — SIGNIFICANT CHANGE UP (ref 32–36)
MCV RBC AUTO: 88.6 FL — SIGNIFICANT CHANGE UP (ref 80–100)
MONOCYTES # BLD AUTO: 0.37 K/UL — SIGNIFICANT CHANGE UP (ref 0–0.9)
MONOCYTES NFR BLD AUTO: 3.7 % — SIGNIFICANT CHANGE UP (ref 2–14)
NEUTROPHILS # BLD AUTO: 8.98 K/UL — HIGH (ref 1.8–7.4)
NEUTROPHILS NFR BLD AUTO: 90.6 % — HIGH (ref 43–77)
NRBC # FLD: 0 — SIGNIFICANT CHANGE UP
PHOSPHATE SERPL-MCNC: 3.1 MG/DL — SIGNIFICANT CHANGE UP (ref 2.5–4.5)
PLATELET # BLD AUTO: 123 K/UL — LOW (ref 150–400)
PMV BLD: 10.7 FL — SIGNIFICANT CHANGE UP (ref 7–13)
POTASSIUM SERPL-MCNC: 3.2 MMOL/L — LOW (ref 3.5–5.3)
POTASSIUM SERPL-SCNC: 3.2 MMOL/L — LOW (ref 3.5–5.3)
POTASSIUM UR-SCNC: 23.3 MMOL/L — SIGNIFICANT CHANGE UP
PROT SERPL-MCNC: 5.4 G/DL — LOW (ref 6–8.3)
PROTHROM AB SERPL-ACNC: 14.6 SEC — HIGH (ref 9.8–13.1)
RBC # BLD: 3.59 M/UL — LOW (ref 3.8–5.2)
RBC # FLD: 13.9 % — SIGNIFICANT CHANGE UP (ref 10.3–14.5)
SODIUM SERPL-SCNC: 135 MMOL/L — SIGNIFICANT CHANGE UP (ref 135–145)
SODIUM UR-SCNC: 33 MMOL/L — SIGNIFICANT CHANGE UP
SPECIMEN SOURCE: SIGNIFICANT CHANGE UP
SPECIMEN SOURCE: SIGNIFICANT CHANGE UP
TRIGL SERPL-MCNC: 188 MG/DL — HIGH (ref 10–149)
TRIGL SERPL-MCNC: 200 MG/DL — HIGH (ref 10–149)
TSH SERPL-MCNC: 3.17 UIU/ML — SIGNIFICANT CHANGE UP (ref 0.27–4.2)
TSH SERPL-MCNC: 3.3 UIU/ML — SIGNIFICANT CHANGE UP (ref 0.27–4.2)
WBC # BLD: 9.92 K/UL — SIGNIFICANT CHANGE UP (ref 3.8–10.5)
WBC # FLD AUTO: 9.92 K/UL — SIGNIFICANT CHANGE UP (ref 3.8–10.5)

## 2018-08-28 PROCEDURE — 99233 SBSQ HOSP IP/OBS HIGH 50: CPT

## 2018-08-28 RX ORDER — ACETAMINOPHEN 500 MG
325 TABLET ORAL ONCE
Qty: 0 | Refills: 0 | Status: COMPLETED | OUTPATIENT
Start: 2018-08-28 | End: 2018-08-28

## 2018-08-28 RX ORDER — IBUPROFEN 200 MG
400 TABLET ORAL ONCE
Qty: 0 | Refills: 0 | Status: COMPLETED | OUTPATIENT
Start: 2018-08-28 | End: 2018-08-28

## 2018-08-28 RX ORDER — MAGNESIUM SULFATE 500 MG/ML
1 VIAL (ML) INJECTION ONCE
Qty: 0 | Refills: 0 | Status: COMPLETED | OUTPATIENT
Start: 2018-08-28 | End: 2018-08-28

## 2018-08-28 RX ORDER — LOSARTAN POTASSIUM 100 MG/1
50 TABLET, FILM COATED ORAL DAILY
Qty: 0 | Refills: 0 | Status: DISCONTINUED | OUTPATIENT
Start: 2018-08-28 | End: 2018-08-29

## 2018-08-28 RX ORDER — PIPERACILLIN AND TAZOBACTAM 4; .5 G/20ML; G/20ML
3.38 INJECTION, POWDER, LYOPHILIZED, FOR SOLUTION INTRAVENOUS EVERY 12 HOURS
Qty: 0 | Refills: 0 | Status: DISCONTINUED | OUTPATIENT
Start: 2018-08-28 | End: 2018-08-31

## 2018-08-28 RX ADMIN — Medication 100 GRAM(S): at 02:09

## 2018-08-28 RX ADMIN — PIPERACILLIN AND TAZOBACTAM 25 GRAM(S): 4; .5 INJECTION, POWDER, LYOPHILIZED, FOR SOLUTION INTRAVENOUS at 14:00

## 2018-08-28 RX ADMIN — Medication 325 MILLIGRAM(S): at 23:37

## 2018-08-28 RX ADMIN — LOSARTAN POTASSIUM 50 MILLIGRAM(S): 100 TABLET, FILM COATED ORAL at 19:20

## 2018-08-28 RX ADMIN — Medication 25 MICROGRAM(S): at 07:02

## 2018-08-28 RX ADMIN — Medication 400 MILLIGRAM(S): at 00:45

## 2018-08-28 RX ADMIN — HEPARIN SODIUM 5000 UNIT(S): 5000 INJECTION INTRAVENOUS; SUBCUTANEOUS at 13:59

## 2018-08-28 RX ADMIN — HEPARIN SODIUM 5000 UNIT(S): 5000 INJECTION INTRAVENOUS; SUBCUTANEOUS at 23:38

## 2018-08-28 RX ADMIN — PIPERACILLIN AND TAZOBACTAM 25 GRAM(S): 4; .5 INJECTION, POWDER, LYOPHILIZED, FOR SOLUTION INTRAVENOUS at 02:09

## 2018-08-28 RX ADMIN — HEPARIN SODIUM 5000 UNIT(S): 5000 INJECTION INTRAVENOUS; SUBCUTANEOUS at 07:01

## 2018-08-28 NOTE — PROGRESS NOTE ADULT - SUBJECTIVE AND OBJECTIVE BOX
Patient is a 58y old  Female who presents with a chief complaint of pyelonephritis (27 Aug 2018 20:17)      SUBJECTIVE / OVERNIGHT EVENTS:    Review of Systems:   General: Denies fedver or chills  Skin: Denies presence of new rash  Ophthalmologic: Denies acute changes in vision, discoloration or discharge  Respiratory and Thorax: Denies any shortness of breath  Cardiovascular: Denies chest pains or palpitations  Gastroenterology: Denies any abdominal pain or new changes in bowels  Genitourinary: Denies dysruria or urgency  Neurological: Denies new focal neurological deficits  Psychiatric: Denies hallucinations or delusions  Endocirine: Denies heat or cold intolerance    MEDICATIONS  (STANDING):  heparin  Injectable 5000 Unit(s) SubCutaneous every 8 hours  levothyroxine 25 MICROGram(s) Oral daily  losartan 50 milliGRAM(s) Oral daily  piperacillin/tazobactam IVPB. 3.375 Gram(s) IV Intermittent every 12 hours  sodium chloride 0.9%. 1000 milliLiter(s) (100 mL/Hr) IV Continuous <Continuous>    MEDICATIONS  (PRN):  acetaminophen   Tablet 650 milliGRAM(s) Oral every 6 hours PRN For Temp greater than 38 C (100.4 F)      PHYSICAL EXAM:  T(C): 36.6 (18 @ 15:15), Max: 38 (18 @ 00:48)  HR: 72 (18 @ 15:22) (66 - 100)  BP: 154/117 (18 @ 15:22) (93/39 - 154/117)  RR: 19 (18 @ 10:26) (17 - 22)  SpO2: 100% (18 @ 15:15) (94% - 100%)  I&O's Summary      	GENERAL: NAD, well-developed, pleasant, daughter bedside  	HEAD:  Atraumatic, Normocephalic  	NECK: Supple, No JVD  	CHEST/LUNG: Clear to auscultation bilaterally; No wheeze  	HEART: Regular rate and rhythm; No murmurs, rubs, or gallops  	ABDOMEN: R CVA tenderness to perc. tenderness to deep palpation of Rt upper abd. qdrnt, pain on deep palpation of epigastric area. bowel sounds positive  	EXTREMITIES:  2+ Peripheral Pulses, No clubbing, cyanosis, or edema  SKIN: No rashes or lesions    LABS:  CAPILLARY BLOOD GLUCOSE                              10.5   9.92  )-----------( 123      ( 28 Aug 2018 06:31 )             31.8         135  |  99  |  35<H>  ----------------------------<  106<H>  3.2<L>   |  21<L>  |  2.37<H>    Ca    7.8<L>      28 Aug 2018 06:31  Phos  3.1       Mg     1.9         TPro  5.4<L>  /  Alb  2.5<L>  /  TBili  1.4<H>  /  DBili  x   /  AST  39<H>  /  ALT  21  /  AlkPhos  78      PT/INR - ( 28 Aug 2018 06:31 )   PT: 14.6 SEC;   INR: 1.26          PTT - ( 28 Aug 2018 06:31 )  PTT:36.6 SEC      Urinalysis Basic - ( 27 Aug 2018 16:30 )    Color: YELLOW / Appearance: Lt TURBID / S.018 / pH: 6.0  Gluc: NEGATIVE / Ketone: NEGATIVE  / Bili: NEGATIVE / Urobili: NORMAL   Blood: MODERATE / Protein: MODERATE / Nitrite: NEGATIVE   Leuk Esterase: LARGE / RBC: 3-5 / WBC >50   Sq Epi: FEW / Non Sq Epi: x / Bacteria: NEGATIVE        RADIOLOGY & ADDITIONAL TESTS:    Imaging Personally Reviewed: Imaging report otto cavazos    Consultant(s) Notes Reviewed:      Care Discussed with Consultants/Other Providers: Patient is a 58y old  Female who presents with a chief complaint of pyelonephritis (27 Aug 2018 20:17)      SUBJECTIVE / OVERNIGHT EVENTS:  Patient still has some pain related to her infection. No new complaints. Daughter is at bedside.      MEDICATIONS  (STANDING):  heparin  Injectable 5000 Unit(s) SubCutaneous every 8 hours  levothyroxine 25 MICROGram(s) Oral daily  losartan 50 milliGRAM(s) Oral daily  piperacillin/tazobactam IVPB. 3.375 Gram(s) IV Intermittent every 12 hours  sodium chloride 0.9%. 1000 milliLiter(s) (100 mL/Hr) IV Continuous <Continuous>    MEDICATIONS  (PRN):  acetaminophen   Tablet 650 milliGRAM(s) Oral every 6 hours PRN For Temp greater than 38 C (100.4 F)      PHYSICAL EXAM:  T(C): 36.6 (18 @ 15:15), Max: 38 (18 @ 00:48)  HR: 72 (18 @ 15:22) (66 - 100)  BP: 154/117 (18 @ 15:22) (93/39 - 154/117)  RR: 19 (18 @ 10:26) (17 - 22)  SpO2: 100% (18 @ 15:15) (94% - 100%)  I&O's Summary      	GENERAL: NAD, well-developed, pleasant, daughter bedside  	HEAD:  Atraumatic, Normocephalic  	NECK: Supple, No JVD  	CHEST/LUNG: Clear to auscultation bilaterally; No wheeze  	HEART: Regular rate and rhythm; No murmurs, rubs, or gallops  	ABDOMEN: R CVA tenderness to perc. tenderness to deep palpation of Rt upper abd. qdrnt, pain on deep palpation of epigastric area. bowel sounds positive  	EXTREMITIES:  2+ Peripheral Pulses, No clubbing, cyanosis, or edema  SKIN: No rashes or lesions    LABS:  CAPILLARY BLOOD GLUCOSE                              10.5   9.92  )-----------( 123      ( 28 Aug 2018 06:31 )             31.8         135  |  99  |  35<H>  ----------------------------<  106<H>  3.2<L>   |  21<L>  |  2.37<H>    Ca    7.8<L>      28 Aug 2018 06:31  Phos  3.1       Mg     1.9         TPro  5.4<L>  /  Alb  2.5<L>  /  TBili  1.4<H>  /  DBili  x   /  AST  39<H>  /  ALT  21  /  AlkPhos  78      PT/INR - ( 28 Aug 2018 06:31 )   PT: 14.6 SEC;   INR: 1.26          PTT - ( 28 Aug 2018 06:31 )  PTT:36.6 SEC      Urinalysis Basic - ( 27 Aug 2018 16:30 )    Color: YELLOW / Appearance: Lt TURBID / S.018 / pH: 6.0  Gluc: NEGATIVE / Ketone: NEGATIVE  / Bili: NEGATIVE / Urobili: NORMAL   Blood: MODERATE / Protein: MODERATE / Nitrite: NEGATIVE   Leuk Esterase: LARGE / RBC: 3-5 / WBC >50   Sq Epi: FEW / Non Sq Epi: x / Bacteria: NEGATIVE        RADIOLOGY & ADDITIONAL TESTS:    Imaging Personally Reviewed: Imaging report otto cavazos    Consultant(s) Notes Reviewed:      Care Discussed with Consultants/Other Providers:

## 2018-08-28 NOTE — PROGRESS NOTE ADULT - PROBLEM SELECTOR PLAN 4
Pt takes Telmisartan and Chlorthalidone at home  - will hold antihypertensives for now as pt was hypotensive in the ED

## 2018-08-28 NOTE — PROGRESS NOTE ADULT - PROBLEM SELECTOR PLAN 1
tachypneic, tachycardic, leukocytosis, febrile, CT A/P suspicious for Rt pyelonephritis  -s/p Vanc/Zosyn and fluids in the ED  -U/A positive, CT suspicious of pyelo   -c/w Zosyn renal dosing for now, may need increase dosage once renal function improves  -f/u urine and blood clxs  -c/w maintenance fluids @ 100cc/hr

## 2018-08-28 NOTE — PROGRESS NOTE ADULT - PROBLEM SELECTOR PLAN 10
VTE: SubQ Hep  Diet: SAJAN Titus [id number]  Internal Medicine Department  Pager: [phone] / [phone] VTE: SubQ Hep  Diet: DASH

## 2018-08-28 NOTE — PROGRESS NOTE ADULT - PROBLEM SELECTOR PLAN 9
Pt seen by Dr. Marlena Rubi for follow up. Pt reports that she was getting MRI done today, but could not complete the study today.  -Reach out to Dr. Rubi and see if she can get MRI done as inpt to accelerate w/up once she leaves hospital

## 2018-08-28 NOTE — PROGRESS NOTE ADULT - PROBLEM SELECTOR PLAN 7
Likely prerenal due to dehydration and Pyelonephritis;   Baseline Cr at 1.08 8/82016  -IVF hydration  -Monitor Renal response  -strict I/O  -urine lytes  -Consider Renal c/s

## 2018-08-29 LAB
ALBUMIN SERPL ELPH-MCNC: 2.8 G/DL — LOW (ref 3.3–5)
ALP SERPL-CCNC: 182 U/L — HIGH (ref 40–120)
ALT FLD-CCNC: 36 U/L — HIGH (ref 4–33)
AST SERPL-CCNC: 80 U/L — HIGH (ref 4–32)
BACTERIA UR CULT: SIGNIFICANT CHANGE UP
BASOPHILS # BLD AUTO: 0.03 K/UL — SIGNIFICANT CHANGE UP (ref 0–0.2)
BASOPHILS NFR BLD AUTO: 0.3 % — SIGNIFICANT CHANGE UP (ref 0–2)
BILIRUB SERPL-MCNC: 1.1 MG/DL — SIGNIFICANT CHANGE UP (ref 0.2–1.2)
BUN SERPL-MCNC: 26 MG/DL — HIGH (ref 7–23)
CALCIUM SERPL-MCNC: 8.7 MG/DL — SIGNIFICANT CHANGE UP (ref 8.4–10.5)
CHLORIDE SERPL-SCNC: 103 MMOL/L — SIGNIFICANT CHANGE UP (ref 98–107)
CO2 SERPL-SCNC: 22 MMOL/L — SIGNIFICANT CHANGE UP (ref 22–31)
CREAT SERPL-MCNC: 1.78 MG/DL — HIGH (ref 0.5–1.3)
EOSINOPHIL # BLD AUTO: 0.09 K/UL — SIGNIFICANT CHANGE UP (ref 0–0.5)
EOSINOPHIL NFR BLD AUTO: 1 % — SIGNIFICANT CHANGE UP (ref 0–6)
GLUCOSE SERPL-MCNC: 91 MG/DL — SIGNIFICANT CHANGE UP (ref 70–99)
HCT VFR BLD CALC: 33.3 % — LOW (ref 34.5–45)
HGB BLD-MCNC: 11.2 G/DL — LOW (ref 11.5–15.5)
IMM GRANULOCYTES # BLD AUTO: 0.07 # — SIGNIFICANT CHANGE UP
IMM GRANULOCYTES NFR BLD AUTO: 0.8 % — SIGNIFICANT CHANGE UP (ref 0–1.5)
LYMPHOCYTES # BLD AUTO: 1.02 K/UL — SIGNIFICANT CHANGE UP (ref 1–3.3)
LYMPHOCYTES # BLD AUTO: 11.6 % — LOW (ref 13–44)
MCHC RBC-ENTMCNC: 29.1 PG — SIGNIFICANT CHANGE UP (ref 27–34)
MCHC RBC-ENTMCNC: 33.6 % — SIGNIFICANT CHANGE UP (ref 32–36)
MCV RBC AUTO: 86.5 FL — SIGNIFICANT CHANGE UP (ref 80–100)
MONOCYTES # BLD AUTO: 0.9 K/UL — SIGNIFICANT CHANGE UP (ref 0–0.9)
MONOCYTES NFR BLD AUTO: 10.2 % — SIGNIFICANT CHANGE UP (ref 2–14)
NEUTROPHILS # BLD AUTO: 6.68 K/UL — SIGNIFICANT CHANGE UP (ref 1.8–7.4)
NEUTROPHILS NFR BLD AUTO: 76.1 % — SIGNIFICANT CHANGE UP (ref 43–77)
NRBC # FLD: 0 — SIGNIFICANT CHANGE UP
PLATELET # BLD AUTO: 135 K/UL — LOW (ref 150–400)
PMV BLD: 11.1 FL — SIGNIFICANT CHANGE UP (ref 7–13)
POTASSIUM SERPL-MCNC: 3.5 MMOL/L — SIGNIFICANT CHANGE UP (ref 3.5–5.3)
POTASSIUM SERPL-SCNC: 3.5 MMOL/L — SIGNIFICANT CHANGE UP (ref 3.5–5.3)
PROT SERPL-MCNC: 6.5 G/DL — SIGNIFICANT CHANGE UP (ref 6–8.3)
RBC # BLD: 3.85 M/UL — SIGNIFICANT CHANGE UP (ref 3.8–5.2)
RBC # FLD: 14.1 % — SIGNIFICANT CHANGE UP (ref 10.3–14.5)
SODIUM SERPL-SCNC: 137 MMOL/L — SIGNIFICANT CHANGE UP (ref 135–145)
SPECIMEN SOURCE: SIGNIFICANT CHANGE UP
WBC # BLD: 8.79 K/UL — SIGNIFICANT CHANGE UP (ref 3.8–10.5)
WBC # FLD AUTO: 8.79 K/UL — SIGNIFICANT CHANGE UP (ref 3.8–10.5)

## 2018-08-29 PROCEDURE — 99233 SBSQ HOSP IP/OBS HIGH 50: CPT

## 2018-08-29 RX ORDER — LOSARTAN POTASSIUM 100 MG/1
75 TABLET, FILM COATED ORAL DAILY
Qty: 0 | Refills: 0 | Status: DISCONTINUED | OUTPATIENT
Start: 2018-08-30 | End: 2018-08-31

## 2018-08-29 RX ORDER — LOSARTAN POTASSIUM 100 MG/1
25 TABLET, FILM COATED ORAL ONCE
Qty: 0 | Refills: 0 | Status: COMPLETED | OUTPATIENT
Start: 2018-08-29 | End: 2018-08-29

## 2018-08-29 RX ADMIN — Medication 325 MILLIGRAM(S): at 00:07

## 2018-08-29 RX ADMIN — PIPERACILLIN AND TAZOBACTAM 25 GRAM(S): 4; .5 INJECTION, POWDER, LYOPHILIZED, FOR SOLUTION INTRAVENOUS at 02:24

## 2018-08-29 RX ADMIN — PIPERACILLIN AND TAZOBACTAM 25 GRAM(S): 4; .5 INJECTION, POWDER, LYOPHILIZED, FOR SOLUTION INTRAVENOUS at 14:20

## 2018-08-29 RX ADMIN — LOSARTAN POTASSIUM 50 MILLIGRAM(S): 100 TABLET, FILM COATED ORAL at 07:12

## 2018-08-29 RX ADMIN — Medication 25 MICROGRAM(S): at 07:11

## 2018-08-29 RX ADMIN — HEPARIN SODIUM 5000 UNIT(S): 5000 INJECTION INTRAVENOUS; SUBCUTANEOUS at 22:25

## 2018-08-29 RX ADMIN — HEPARIN SODIUM 5000 UNIT(S): 5000 INJECTION INTRAVENOUS; SUBCUTANEOUS at 14:58

## 2018-08-29 RX ADMIN — LOSARTAN POTASSIUM 25 MILLIGRAM(S): 100 TABLET, FILM COATED ORAL at 15:12

## 2018-08-29 RX ADMIN — HEPARIN SODIUM 5000 UNIT(S): 5000 INJECTION INTRAVENOUS; SUBCUTANEOUS at 07:13

## 2018-08-29 NOTE — PROGRESS NOTE ADULT - PROBLEM SELECTOR PLAN 9
Pt seen by Dr. Marlena Rubi for follow up. Pt reports that she was getting MRI done today, but could not complete the study today.

## 2018-08-29 NOTE — PROGRESS NOTE ADULT - PROBLEM SELECTOR PLAN 1
tachypneic, tachycardic, leukocytosis, febrile, CT A/P suspicious for Rt pyelonephritis  -s/p Vanc/Zosyn and fluids in the ED  -U/A positive, CT suspicious of pyelo   -c/w Zosyn renal dosing for now, may need increase dosage once renal function improves  -f/u urine and blood clxs  -c/w maintenance fluids @ 100cc/hr  -D/C patient if no more fevers tomorrow with outpatient follow up.

## 2018-08-29 NOTE — PROGRESS NOTE ADULT - SUBJECTIVE AND OBJECTIVE BOX
Patient is a 58y old  Female who presents with a chief complaint of pyelonephritis (27 Aug 2018 20:17)      SUBJECTIVE / OVERNIGHT EVENTS:  No new complaints, patient would like to go home. States that the pain related to the infection has subsided, denies dysuria.       MEDICATIONS  (STANDING):  heparin  Injectable 5000 Unit(s) SubCutaneous every 8 hours  levothyroxine 25 MICROGram(s) Oral daily  piperacillin/tazobactam IVPB. 3.375 Gram(s) IV Intermittent every 12 hours  sodium chloride 0.9%. 1000 milliLiter(s) (100 mL/Hr) IV Continuous <Continuous>    MEDICATIONS  (PRN):  acetaminophen   Tablet 650 milliGRAM(s) Oral every 6 hours PRN For Temp greater than 38 C (100.4 F)      PHYSICAL EXAM:  T(C): 36.8 (18 @ 14:16), Max: 37.5 (18 @ 23:12)  HR: 73 (18 @ 14:37) (70 - 90)  BP: 146/103 (18 @ 14:37) (94/50 - 164/95)  RR: 18 (18 @ 14:16) (18 - 19)  SpO2: 100% (18 @ 14:16) (97% - 100%)  I&O's Summary    28 Aug 2018 07:01  -  29 Aug 2018 07:00  --------------------------------------------------------  IN: 0 mL / OUT: 200 mL / NET: -200 mL      GENERAL: NAD, well-developed  HEAD:  Atraumatic, Normocephalic  EYES: EOMI, PERRLA, conjunctiva and sclera clear  NECK: Supple, No elevated JVD  CHEST/LUNG: Clear to auscultation bilaterally; No wheeze  HEART: Regular rate and rhythm; No murmurs, rubs, or gallops  ABDOMEN: Soft, Nontender, Nondistended; Bowel sounds present  EXTREMITIES:  2+ Peripheral Pulses, No clubbing, cyanosis, or edema  PSYCH: AAOx3  NEUROLOGY: CN II-XII grossly intact, moving all extremities  SKIN: No rashes or lesions    LABS:  CAPILLARY BLOOD GLUCOSE                              11.2   8.79  )-----------( 135      ( 29 Aug 2018 06:25 )             33.3         137  |  103  |  26<H>  ----------------------------<  91  3.5   |  22  |  1.78<H>    Ca    8.7      29 Aug 2018 06:25  Phos  3.1       Mg     1.9         TPro  6.5  /  Alb  2.8<L>  /  TBili  1.1  /  DBili  x   /  AST  80<H>  /  ALT  36<H>  /  AlkPhos  182<H>      PT/INR - ( 28 Aug 2018 06:31 )   PT: 14.6 SEC;   INR: 1.26          PTT - ( 28 Aug 2018 06:31 )  PTT:36.6 SEC      Urinalysis Basic - ( 27 Aug 2018 16:30 )    Color: YELLOW / Appearance: Lt TURBID / S.018 / pH: 6.0  Gluc: NEGATIVE / Ketone: NEGATIVE  / Bili: NEGATIVE / Urobili: NORMAL   Blood: MODERATE / Protein: MODERATE / Nitrite: NEGATIVE   Leuk Esterase: LARGE / RBC: 3-5 / WBC >50   Sq Epi: FEW / Non Sq Epi: x / Bacteria: NEGATIVE        RADIOLOGY & ADDITIONAL TESTS:    Imaging Personally Reviewed:    Consultant(s) Notes Reviewed:      Care Discussed with Consultants/Other Providers:

## 2018-08-30 PROBLEM — I10 ESSENTIAL (PRIMARY) HYPERTENSION: Chronic | Status: ACTIVE | Noted: 2018-08-27

## 2018-08-30 PROBLEM — E03.9 HYPOTHYROIDISM, UNSPECIFIED: Chronic | Status: ACTIVE | Noted: 2018-08-27

## 2018-08-30 PROBLEM — M06.9 RHEUMATOID ARTHRITIS, UNSPECIFIED: Chronic | Status: ACTIVE | Noted: 2018-08-27

## 2018-08-30 LAB
BUN SERPL-MCNC: 18 MG/DL — SIGNIFICANT CHANGE UP (ref 7–23)
CALCIUM SERPL-MCNC: 8.5 MG/DL — SIGNIFICANT CHANGE UP (ref 8.4–10.5)
CHLORIDE SERPL-SCNC: 101 MMOL/L — SIGNIFICANT CHANGE UP (ref 98–107)
CO2 SERPL-SCNC: 21 MMOL/L — LOW (ref 22–31)
CREAT SERPL-MCNC: 1.54 MG/DL — HIGH (ref 0.5–1.3)
GLUCOSE SERPL-MCNC: 84 MG/DL — SIGNIFICANT CHANGE UP (ref 70–99)
HCT VFR BLD CALC: 34.8 % — SIGNIFICANT CHANGE UP (ref 34.5–45)
HGB BLD-MCNC: 11.6 G/DL — SIGNIFICANT CHANGE UP (ref 11.5–15.5)
MCHC RBC-ENTMCNC: 30 PG — SIGNIFICANT CHANGE UP (ref 27–34)
MCHC RBC-ENTMCNC: 33.3 % — SIGNIFICANT CHANGE UP (ref 32–36)
MCV RBC AUTO: 89.9 FL — SIGNIFICANT CHANGE UP (ref 80–100)
METHOD TYPE: SIGNIFICANT CHANGE UP
NRBC # FLD: 0 — SIGNIFICANT CHANGE UP
ORGANISM # SPEC MICROSCOPIC CNT: SIGNIFICANT CHANGE UP
ORGANISM # SPEC MICROSCOPIC CNT: SIGNIFICANT CHANGE UP
PLATELET # BLD AUTO: 138 K/UL — LOW (ref 150–400)
PMV BLD: 10.9 FL — SIGNIFICANT CHANGE UP (ref 7–13)
POTASSIUM SERPL-MCNC: 3.4 MMOL/L — LOW (ref 3.5–5.3)
POTASSIUM SERPL-SCNC: 3.4 MMOL/L — LOW (ref 3.5–5.3)
RBC # BLD: 3.87 M/UL — SIGNIFICANT CHANGE UP (ref 3.8–5.2)
RBC # FLD: 14.5 % — SIGNIFICANT CHANGE UP (ref 10.3–14.5)
SODIUM SERPL-SCNC: 137 MMOL/L — SIGNIFICANT CHANGE UP (ref 135–145)
WBC # BLD: 9.34 K/UL — SIGNIFICANT CHANGE UP (ref 3.8–10.5)
WBC # FLD AUTO: 9.34 K/UL — SIGNIFICANT CHANGE UP (ref 3.8–10.5)

## 2018-08-30 PROCEDURE — 99233 SBSQ HOSP IP/OBS HIGH 50: CPT

## 2018-08-30 RX ADMIN — HEPARIN SODIUM 5000 UNIT(S): 5000 INJECTION INTRAVENOUS; SUBCUTANEOUS at 22:07

## 2018-08-30 RX ADMIN — PIPERACILLIN AND TAZOBACTAM 25 GRAM(S): 4; .5 INJECTION, POWDER, LYOPHILIZED, FOR SOLUTION INTRAVENOUS at 15:24

## 2018-08-30 RX ADMIN — HEPARIN SODIUM 5000 UNIT(S): 5000 INJECTION INTRAVENOUS; SUBCUTANEOUS at 06:14

## 2018-08-30 RX ADMIN — Medication 25 MICROGRAM(S): at 06:14

## 2018-08-30 RX ADMIN — LOSARTAN POTASSIUM 75 MILLIGRAM(S): 100 TABLET, FILM COATED ORAL at 06:14

## 2018-08-30 RX ADMIN — PIPERACILLIN AND TAZOBACTAM 25 GRAM(S): 4; .5 INJECTION, POWDER, LYOPHILIZED, FOR SOLUTION INTRAVENOUS at 02:08

## 2018-08-30 NOTE — PROGRESS NOTE ADULT - PROBLEM SELECTOR PLAN 1
tachypneic, tachycardic, leukocytosis, febrile, CT A/P suspicious for Rt pyelonephritis  -U/A positive, CT suspicious of pyelo   -c/w Zosyn renal dosing for now, may need increase dosage once renal function improves  -BCx positive for E coli  -c/w maintenance fluids @ 100cc/hr

## 2018-08-30 NOTE — PROGRESS NOTE ADULT - SUBJECTIVE AND OBJECTIVE BOX
CHIEF COMPLAINT: flank pain    Interval Events:    Feels well today.  No pain    REVIEW OF SYSTEMS:    CONSTITUTIONAL: No weakness, fevers or chills  EYES/ENT: No visual changes;  No vertigo or throat pain   NECK: No pain or stiffness  RESPIRATORY: No cough, wheezing, hemoptysis; No shortness of breath  CARDIOVASCULAR: No chest pain or palpitations  GASTROINTESTINAL: No abdominal or epigastric pain. No nausea, vomiting, or hematemesis; No diarrhea or constipation. No melena or hematochezia.  GENITOURINARY: No dysuria, frequency or hematuria  NEUROLOGICAL: No numbness or weakness  SKIN: No itching, rashes      OBJECTIVE:  ICU Vital Signs Last 24 Hrs  T(C): 36.4 (30 Aug 2018 09:43), Max: 37.1 (29 Aug 2018 22:23)  T(F): 97.6 (30 Aug 2018 09:43), Max: 98.8 (29 Aug 2018 22:23)  HR: 77 (30 Aug 2018 09:43) (62 - 98)  BP: 144/103 (30 Aug 2018 09:43) (136/81 - 155/98)  BP(mean): --  ABP: --  ABP(mean): --  RR: 17 (30 Aug 2018 09:43) (16 - 18)  SpO2: 96% (30 Aug 2018 09:43) (96% - 100%)        08-29 @ 07:01  -  08-30 @ 07:00  --------------------------------------------------------  IN: 850 mL / OUT: 200 mL / NET: 650 mL      CAPILLARY BLOOD GLUCOSE      PHYSICAL EXAM:  GENERAL: NAD, well-developed  HEAD:  Atraumatic, Normocephalic  EYES: EOMI, PERRLA, conjunctiva and sclera clear  NECK: Supple, No JVD  CHEST/LUNG: Clear to auscultation bilaterally; No wheeze  HEART: Regular rate and rhythm; No murmurs, rubs, or gallops  ABDOMEN: Soft, Nontender, Nondistended; Bowel sounds present  EXTREMITIES:  2+ Peripheral Pulses, No clubbing, cyanosis, or edema  PSYCH: AAOx3  NEUROLOGY: non-focal  SKIN: No rashes or lesions    LINES:    HOSPITAL MEDICATIONS:  heparin  Injectable 5000 Unit(s) SubCutaneous every 8 hours    piperacillin/tazobactam IVPB. 3.375 Gram(s) IV Intermittent every 12 hours    losartan 75 milliGRAM(s) Oral daily    levothyroxine 25 MICROGram(s) Oral daily      acetaminophen   Tablet 650 milliGRAM(s) Oral every 6 hours PRN          sodium chloride 0.9%. 1000 milliLiter(s) IV Continuous <Continuous>            LABS:                        11.6   9.34  )-----------( 138      ( 30 Aug 2018 06:20 )             34.8     Hgb Trend: 11.6<--, 11.2<--, 10.5<--, 12.8<--  08-30    137  |  101  |  18  ----------------------------<  84  3.4<L>   |  21<L>  |  1.54<H>    Ca    8.5      30 Aug 2018 06:20    TPro  6.5  /  Alb  2.8<L>  /  TBili  1.1  /  DBili  x   /  AST  80<H>  /  ALT  36<H>  /  AlkPhos  182<H>  08-29    Creatinine Trend: 1.54<--, 1.78<--, 2.37<--, 2.79<--            MICROBIOLOGY:     RADIOLOGY:  [ ] Reviewed and interpreted by me    EKG:

## 2018-08-31 ENCOUNTER — TRANSCRIPTION ENCOUNTER (OUTPATIENT)
Age: 58
End: 2018-08-31

## 2018-08-31 VITALS
DIASTOLIC BLOOD PRESSURE: 90 MMHG | HEART RATE: 72 BPM | SYSTOLIC BLOOD PRESSURE: 142 MMHG | OXYGEN SATURATION: 98 % | RESPIRATION RATE: 18 BRPM | TEMPERATURE: 98 F

## 2018-08-31 LAB
BUN SERPL-MCNC: 14 MG/DL — SIGNIFICANT CHANGE UP (ref 7–23)
CALCIUM SERPL-MCNC: 8.5 MG/DL — SIGNIFICANT CHANGE UP (ref 8.4–10.5)
CHLORIDE SERPL-SCNC: 98 MMOL/L — SIGNIFICANT CHANGE UP (ref 98–107)
CO2 SERPL-SCNC: 22 MMOL/L — SIGNIFICANT CHANGE UP (ref 22–31)
CREAT SERPL-MCNC: 1.46 MG/DL — HIGH (ref 0.5–1.3)
GLUCOSE SERPL-MCNC: 89 MG/DL — SIGNIFICANT CHANGE UP (ref 70–99)
ORGANISM # SPEC MICROSCOPIC CNT: SIGNIFICANT CHANGE UP
POTASSIUM SERPL-MCNC: 3.4 MMOL/L — LOW (ref 3.5–5.3)
POTASSIUM SERPL-SCNC: 3.4 MMOL/L — LOW (ref 3.5–5.3)
SODIUM SERPL-SCNC: 136 MMOL/L — SIGNIFICANT CHANGE UP (ref 135–145)

## 2018-08-31 PROCEDURE — 99239 HOSP IP/OBS DSCHRG MGMT >30: CPT

## 2018-08-31 RX ORDER — METHOTREXATE 2.5 MG/1
8 TABLET ORAL
Qty: 0 | Refills: 0 | COMMUNITY

## 2018-08-31 RX ORDER — CHLORTHALIDONE 50 MG
1 TABLET ORAL
Qty: 0 | Refills: 0 | COMMUNITY

## 2018-08-31 RX ORDER — CEFUROXIME AXETIL 250 MG
1 TABLET ORAL
Qty: 10 | Refills: 0 | OUTPATIENT
Start: 2018-08-31 | End: 2018-09-04

## 2018-08-31 RX ORDER — CEFUROXIME AXETIL 250 MG
250 TABLET ORAL ONCE
Qty: 0 | Refills: 0 | Status: COMPLETED | OUTPATIENT
Start: 2018-08-31 | End: 2018-08-31

## 2018-08-31 RX ORDER — TELMISARTAN 20 MG/1
1 TABLET ORAL
Qty: 0 | Refills: 0 | COMMUNITY

## 2018-08-31 RX ORDER — POTASSIUM CHLORIDE 20 MEQ
20 PACKET (EA) ORAL ONCE
Qty: 0 | Refills: 0 | Status: COMPLETED | OUTPATIENT
Start: 2018-08-31 | End: 2018-08-31

## 2018-08-31 RX ORDER — PIPERACILLIN AND TAZOBACTAM 4; .5 G/20ML; G/20ML
3.38 INJECTION, POWDER, LYOPHILIZED, FOR SOLUTION INTRAVENOUS EVERY 12 HOURS
Qty: 0 | Refills: 0 | Status: DISCONTINUED | OUTPATIENT
Start: 2018-08-31 | End: 2018-08-31

## 2018-08-31 RX ADMIN — HEPARIN SODIUM 5000 UNIT(S): 5000 INJECTION INTRAVENOUS; SUBCUTANEOUS at 06:08

## 2018-08-31 RX ADMIN — Medication 25 MICROGRAM(S): at 06:04

## 2018-08-31 RX ADMIN — Medication 250 MILLIGRAM(S): at 18:02

## 2018-08-31 RX ADMIN — HEPARIN SODIUM 5000 UNIT(S): 5000 INJECTION INTRAVENOUS; SUBCUTANEOUS at 13:27

## 2018-08-31 RX ADMIN — Medication 20 MILLIEQUIVALENT(S): at 09:45

## 2018-08-31 RX ADMIN — LOSARTAN POTASSIUM 75 MILLIGRAM(S): 100 TABLET, FILM COATED ORAL at 06:04

## 2018-08-31 RX ADMIN — PIPERACILLIN AND TAZOBACTAM 25 GRAM(S): 4; .5 INJECTION, POWDER, LYOPHILIZED, FOR SOLUTION INTRAVENOUS at 02:18

## 2018-08-31 NOTE — PROGRESS NOTE ADULT - PROBLEM SELECTOR PLAN 2
CT  A/P suspicious for pyelonephritis  -infectious treatment as above  -will consider switching to oral agent
CT  A/P suspicious for pyelonephritis  -infectious treatment as above  -will consider switching to oral agent once uclx return
CT  A/P suspicious for pyelonephritis  -infectious treatment as above  -will consider switching to oral agent when more culture data
CT  A/P suspicious for pyelonephritis  -infectious treatment as above  -will consider switching to oral agent once uclx return

## 2018-08-31 NOTE — PROGRESS NOTE ADULT - PROBLEM SELECTOR PLAN 4
Pt takes Telmisartan and Chlorthalidone at home  - c/w losartan Pt takes Telmisartan and Chlorthalidone at home  - d/c losartan due to OLEG

## 2018-08-31 NOTE — DISCHARGE NOTE ADULT - CONDITIONS AT DISCHARGE
Ms Timmons is alert, self sufficient with her care, Kwabena speaking and prefers her daughter to translate.  She has no c/o back or Flank Pain ; and uses the Walker to move about safely.  Her Vital signs are stable and Instructions for going Home are provided.

## 2018-08-31 NOTE — PROGRESS NOTE ADULT - PROBLEM SELECTOR PLAN 6
-will continue Synthroid 25mcg QD home dose  -TSH acceptable
-will continue Synthroid 25mcg QD home dose  -TSH in AM

## 2018-08-31 NOTE — PROGRESS NOTE ADULT - PROBLEM SELECTOR PLAN 8
-repeating electrolytes  -f/u on BMPs  -check Magnesium
-repeating electrolytes
-repeating electrolytes  -f/u on BMPs  -check Magnesium
-repeating electrolytes  -f/u on BMPs  -check Magnesium

## 2018-08-31 NOTE — PROGRESS NOTE ADULT - PROBLEM SELECTOR PLAN 3
CT A/P significant for LIVER: Within normal limits. BILE DUCTS: Normal caliber.  GALLBLADDER: Within normal limits.  -Likely symptoms present due to Rt kidney infection rather than gallbladder problem given CT findings   -Monitor
CT A/P significant for LIVER: Within normal limits. BILE DUCTS: Normal caliber.  GALLBLADDER: Within normal limits.  -Likely symptoms present due to Rt kidney infection rather than gallbladder problem given CT findings   -Monitor
No longer in pain  -Monitor
CT A/P significant for LIVER: Within normal limits. BILE DUCTS: Normal caliber.  GALLBLADDER: Within normal limits.  -Likely symptoms present due to Rt kidney infection rather than gallbladder problem given CT findings   -Monitor

## 2018-08-31 NOTE — DISCHARGE NOTE ADULT - PROVIDER TOKENS
FREE:[LAST:[Ambulatory Care Unit at [facility]],PHONE:[phone]],FAX:[phone]],ADDRESS:[[address] [place] [address]]]

## 2018-08-31 NOTE — DISCHARGE NOTE ADULT - MEDICATION SUMMARY - MEDICATIONS TO TAKE
I will START or STAY ON the medications listed below when I get home from the hospital:    Outpatient home PT  -- Indication: For Deconditioning    cefuroxime 250 mg oral tablet  -- 1 tab(s) by mouth 2 times a day   -- Finish all this medication unless otherwise directed by prescriber.  Medication should be taken with plenty of water.  Take with food or milk.    -- Indication: For E coli bacteremia    levothyroxine 25 mcg (0.025 mg) oral capsule  -- 1 cap(s) by mouth once a day  -- Indication: For Hypothyroid    Vitamin D2 50,000 intl units (1.25 mg) oral capsule  -- 1 cap(s) by mouth once a week  -- Indication: For Need for prophylactic measure

## 2018-08-31 NOTE — DISCHARGE NOTE ADULT - CARE PLAN
Principal Discharge DX:	Pyelonephritis  Goal:	Ongoing management  Assessment and plan of treatment:	You were in the hospital with an infection in your kidney.  You improved with antibiotics and will go home with oral antibiotics to complete a 10 day course.  Secondary Diagnosis:	OLEG (acute kidney injury)  Goal:	Ongoing management  Assessment and plan of treatment:	Your kidney function decreased when you were infected.  It has improved but not completely.  It will be important to follow-up with a primary doctor in 1-2 weeks to check on your kidney function  Secondary Diagnosis:	HTN (hypertension)  Goal:	Ongoing management  Assessment and plan of treatment:	You had no blood pressure issues while the hospital.  We discontinued your blood pressure medicines because of this.  Please follow-up with a primary doctor to continue to check your blood pressure  Secondary Diagnosis:	Left breast mass  Goal:	Ongoing management  Assessment and plan of treatment:	Please continue follow-up for breast mass with the breast cancer doctors as was discussed before discharge.  Secondary Diagnosis:	Hypothyroid  Goal:	Ongoing management  Assessment and plan of treatment:	Please continue to take the synthroid as prescribed  Secondary Diagnosis:	Rheumatoid arthritis  Goal:	Ongoing management  Assessment and plan of treatment:	Based on discussion before discharge, you will discontinue the methotrexate until seeing a rheumatologist to follow up.

## 2018-08-31 NOTE — DISCHARGE NOTE ADULT - PLAN OF CARE
Your kidney function decreased when you were infected.  It has improved but not completely.  It will be important to follow-up with a primary doctor in 1-2 weeks to check on your kidney function Ongoing management You had no blood pressure issues while the hospital.  We discontinued your blood pressure medicines because of this.  Please follow-up with a primary doctor to continue to check your blood pressure Please continue follow-up for breast mass with the breast cancer doctors as was discussed before discharge. Please continue to take the synthroid as prescribed Based on discussion before discharge, you will discontinue the methotrexate until seeing a rheumatologist to follow up. You were in the hospital with an infection in your kidney.  You improved with antibiotics and will go home with oral antibiotics to complete a 10 day course.

## 2018-08-31 NOTE — PHYSICAL THERAPY INITIAL EVALUATION ADULT - IMPAIRMENTS CONTRIBUTING TO GAIT DEVIATIONS, PT EVAL
impaired balance/(+) right knee buckling. medical team aware. Patient will need assistance at home due to impaired balance/decreased ROM/decreased strength

## 2018-08-31 NOTE — PROGRESS NOTE ADULT - PROBLEM SELECTOR PLAN 5
pt takes Methotrexate once a week as outpt.  -will hold Methotrexate while pt hospitalized  -Clarify the pt home medications, especially the dose of Methotrexate in AM (primary team)
pt takes Methotrexate once a week as outpt.  -will hold Methotrexate while pt hospitalized
pt takes Methotrexate once a week as outpt.  -will hold Methotrexate while pt hospitalized  -Clarify the pt home medications, especially the dose of Methotrexate in AM (primary team)
pt takes Methotrexate once a week as outpt.  -will hold Methotrexate while pt hospitalized  -Clarify the pt home medications, especially the dose of Methotrexate in AM (primary team)

## 2018-08-31 NOTE — DISCHARGE NOTE ADULT - CARE PROVIDER_API CALL
Ambulatory Care Unit at [facility],   [phone] [place] [address]  Phone: [phone]  Fax: [phone]

## 2018-08-31 NOTE — DISCHARGE NOTE ADULT - HOME CARE AGENCY
[facility] AT [place] [phone]. Nurse to visit on 09/01/2018. Nurse to call prior to visit to arrange. Physical therapy and other services to follow.

## 2018-08-31 NOTE — DISCHARGE NOTE ADULT - OTHER SIGNIFICANT FINDINGS
< from: CT Abdomen and Pelvis No Cont (08.27.18 @ 16:03) >  IMPRESSION:   Enlarged heterogeneous appearing right kidney with urothelial thickening   and mild perinephric stranding, suspicious for pyelonephritis. Underlying   infiltrative process cannot be excluded. No obstructing urinary calculi   bilaterally.    Moderate age indeterminate compression deformity of L1 is new from   8/8/2016.    Solid spiculated mass in the lateral left breast, suspicious for   neoplasm. Correlation with mammography is suggested.    < end of copied text >

## 2018-08-31 NOTE — DISCHARGE NOTE ADULT - PATIENT PORTAL LINK FT
You can access the [facility] Patient Portal, offered by [facility], by registering with the following website: [email or web address]

## 2018-08-31 NOTE — DISCHARGE NOTE ADULT - HOSPITAL COURSE
57yo Female with h/o RA on Methotrexate, HTN, Hypothyroidism, L breast mass being admitted for sepsis 2/2 Rt pyelonephritis in the context of immunosuppression.  She was given zosyn and her symptoms improved.  She had an OLEG on admission that improved with the antibiotics and fluids.  Her Cr did not downtrend back to her baseline two years ago of 1.0.  She will go home with ceftin to complete a 10-day course of antibiotics.  She will need to follow-up in 1-2 weeks to check her kidney function.  She also agrees to follow-up with her breast cancer doctor to further work up her breast mass as well as a rheumatologist if the joint pain returns while off the methotrexate. 59yo Female with h/o RA on Methotrexate, HTN, Hypothyroidism, L breast mass being admitted for sepsis 2/2 Rt pyelonephritis in the context of immunosuppression.  She was given zosyn and her symptoms improved.  She had an OLEG on admission that improved with the antibiotics and fluids.  Her Cr did not downtrend back to her baseline two years ago of 1.0.  She will go home with ceftin to complete a 10-day course of antibiotics.  She will need to follow-up in 1-2 weeks to check her kidney function.  She also agrees to follow-up with her breast cancer doctor to further work up her breast mass as well as a rheumatologist if the joint pain returns while off the methotrexate.  Based on discussion with family, the patient is willing to stop the methotrexate as she did not think it was that helpful for the joint pain. 59yo Female with h/o RA on Methotrexate, HTN, Hypothyroidism, L breast mass being admitted for sepsis 2/2 Rt pyelonephritis in the context of immunosuppression.  She was given zosyn and her symptoms improved.  She had an OLEG on admission that improved with the antibiotics and fluids.  Her Cr did not downtrend back to her baseline two years ago of 1.0.  She later was found to have E coli in her admission blood culture (urine culture was collected after antibiotics).  She was clinically asymptomatic and her white count remained low with no fever.  She was deemed safe to be discharged before final sensitivities arrived for the E Coli.  The team will follow-up the final sensitivities and call the patient if any changes are recommended.   She will go home with ceftin to complete a 10-day course of antibiotics.      She will need to follow-up in 1-2 weeks to check her kidney function.  She also agrees to follow-up with her breast cancer doctor to further work up her breast mass as well as a rheumatologist if the joint pain returns while off the methotrexate.  Based on discussion with family, the patient is willing to stop the methotrexate as she did not think it was that helpful for the joint pain.

## 2018-08-31 NOTE — PROGRESS NOTE ADULT - ASSESSMENT
57yo Female with h/o RA on Methotrexate, HTN, Hypothyroidism, L breast mass being admitted for sepsis 2/2 Rt pyelonephritis in the context of immunosuppression c/b OLEG, mild hyponatremia and hypokalemia.
57yo Female with h/o RA on Methotrexate, HTN, Hypothyroidism, L breast mass being admitted for sepsis 2/2 Rt pyelonephritis in the context of immunosuppression c/b OLEG, mild hyponatremia and hypokalemia. Now with E coli bacteremia, currently assymptomatic
59yo Female with h/o RA on Methotrexate, HTN, Hypothyroidism, L breast mass being admitted for sepsis 2/2 Rt pyelonephritis in the context of immunosuppression c/b OLEG, mild hyponatremia and hypokalemia. Now with E coli bacteremia, currently assymptomatic
59yo Female with h/o RA on Methotrexate, HTN, Hypothyroidism, L breast mass being admitted for sepsis 2/2 Rt pyelonephritis in the context of immunosuppression c/b OLEG, mild hyponatremia and hypokalemia. If no more fevers and renal function improves, will consider DC tomorrow with outpatient follow up.

## 2018-08-31 NOTE — PROGRESS NOTE ADULT - PROBLEM SELECTOR PLAN 7
Improving, likely related to infection Improving, likely related to infection.  Today 1.4, baseline 1.1 from 2 years ago  -Holding losartan  -Will need lab draw in 1-2 weeks to check Cr

## 2018-08-31 NOTE — PROGRESS NOTE ADULT - PROBLEM SELECTOR PLAN 9
Pt seen by Dr. Marlena Rubi for follow up. Pt reports that she was getting MRI done today, but could not complete the study today. Pt seen by Dr. Marlena Rubi for follow up. Pt reports that she was getting MRI done, but could not complete the study.

## 2018-08-31 NOTE — PROGRESS NOTE ADULT - PROBLEM SELECTOR PLAN 1
tachypneic, tachycardic, leukocytosis, febrile, CT A/P suspicious for Rt pyelonephritis  -c/w Zosyn (day 4)  -BCx positive for E coli. Awaiting culture/speciation tachypneic, tachycardic, leukocytosis, febrile, CT A/P suspicious for Rt pyelonephritis  -c/w Zosyn (day 4)  -BCx positive for E coli. Awaiting culture/speciation  -Because asymptomatic, improved white count, lack of fever E coli most likely being treated by zosyn.  Not concerned for ESBL so will discharge patient home with cefdinir.  Will follow-up culture and call patient if sensitivities change antibiotic choice tachypneic, tachycardic, leukocytosis, febrile, CT A/P suspicious for Rt pyelonephritis  -c/w Zosyn (day 4)  -BCx positive for E coli. Awaiting culture/speciation  -Because symptoms resolved, improved white count, lack of fever E coli most likely being treated by zosyn. Will discharge patient home with cefdinir.  Will follow-up culture and call patient if sensitivities change antibiotic choice

## 2018-08-31 NOTE — PHYSICAL THERAPY INITIAL EVALUATION ADULT - PERTINENT HX OF CURRENT PROBLEM, REHAB EVAL
57yo Female with h/o RA on Methotrexate, HTN, Hypothyroidism, L breast mass being admitted for sepsis 2/2 Rt pyelonephritis in the context of immunosuppression c/b OLEG, mild hyponatremia and hypokalemia.

## 2018-08-31 NOTE — DISCHARGE NOTE ADULT - MEDICATION SUMMARY - MEDICATIONS TO STOP TAKING
I will STOP taking the medications listed below when I get home from the hospital:    methotrexate  -- 8 tab(s) by mouth once a week    telmisartan 40 mg oral tablet  -- 1 tab(s) by mouth once a day    levothyroxine 25 mcg (0.025 mg) oral capsule  -- 1 cap(s) by mouth once a day    chlorthalidone 25 mg oral tablet  -- 1 tab(s) by mouth once a day

## 2018-08-31 NOTE — PROGRESS NOTE ADULT - SUBJECTIVE AND OBJECTIVE BOX
CHIEF COMPLAINT: flank pain    Interval Events:    Feels well this morning.  Not in any pain.    REVIEW OF SYSTEMS:    CONSTITUTIONAL: No weakness, fevers or chills  EYES/ENT: No visual changes;  No vertigo or throat pain   NECK: No pain or stiffness  RESPIRATORY: No cough, wheezing, hemoptysis; No shortness of breath  CARDIOVASCULAR: No chest pain or palpitations  GASTROINTESTINAL: No abdominal or epigastric pain. No nausea, vomiting, or hematemesis; No diarrhea or constipation. No melena or hematochezia.  GENITOURINARY: No dysuria, frequency or hematuria  NEUROLOGICAL: No numbness or weakness  SKIN: No itching, rashes      OBJECTIVE:  ICU Vital Signs Last 24 Hrs  T(C): 36.9 (31 Aug 2018 05:56), Max: 36.9 (31 Aug 2018 05:56)  T(F): 98.5 (31 Aug 2018 05:56), Max: 98.5 (31 Aug 2018 05:56)  HR: 68 (31 Aug 2018 05:56) (68 - 89)  BP: 118/68 (31 Aug 2018 05:56) (118/68 - 150/91)  BP(mean): --  ABP: --  ABP(mean): --  RR: 18 (31 Aug 2018 05:56) (16 - 18)  SpO2: 100% (31 Aug 2018 05:56) (96% - 100%)        08-30 @ 07:01  -  08-31 @ 07:00  --------------------------------------------------------  IN: 340 mL / OUT: 0 mL / NET: 340 mL      CAPILLARY BLOOD GLUCOSE      PHYSICAL EXAM:  GENERAL: NAD, well-developed  HEAD:  Atraumatic, Normocephalic  EYES: EOMI, PERRLA, conjunctiva and sclera clear  NECK: Supple, No JVD  CHEST/LUNG: Clear to auscultation bilaterally; No wheeze  HEART: Regular rate and rhythm; No murmurs, rubs, or gallops  ABDOMEN: Soft, Nontender, Nondistended; Bowel sounds present  EXTREMITIES:  2+ Peripheral Pulses, No clubbing, cyanosis, or edema  PSYCH: AAOx3  NEUROLOGY: non-focal  SKIN: No rashes or lesions    LINES:    HOSPITAL MEDICATIONS:  heparin  Injectable 5000 Unit(s) SubCutaneous every 8 hours    piperacillin/tazobactam IVPB. 3.375 Gram(s) IV Intermittent every 12 hours    losartan 75 milliGRAM(s) Oral daily    levothyroxine 25 MICROGram(s) Oral daily      acetaminophen   Tablet 650 milliGRAM(s) Oral every 6 hours PRN          potassium chloride    Tablet ER 20 milliEquivalent(s) Oral once  sodium chloride 0.9%. 1000 milliLiter(s) IV Continuous <Continuous>            LABS:                        11.6   9.34  )-----------( 138      ( 30 Aug 2018 06:20 )             34.8     Hgb Trend: 11.6<--, 11.2<--, 10.5<--, 12.8<--  08-31    136  |  98  |  14  ----------------------------<  89  3.4<L>   |  22  |  1.46<H>    Ca    8.5      31 Aug 2018 06:42      Creatinine Trend: 1.46<--, 1.54<--, 1.78<--, 2.37<--, 2.79<--            MICROBIOLOGY:     Culture - Blood (08.27.18 @ 14:27)    Culture - Blood:   NO ORGANISMS ISOLATED  NO ORGANISMS ISOLATED AT 48 HRS.    ***** CRITICAL RESULT *****  PERSON CALLED / READ-BACK: PAULINA RODRIGUEZ RN. / Y  DATE / TIME CALLED: 08/30/18 0647  CALLED BY: ABHIJIT SWANSON^Gram Neg Rods  AFTER: 60 HOURS INCUBATION  BOTTLE: ANAEROBIC BOTTLE    Organism Identification: BLOOD CULTURE PCR    Method Type: PCR        RADIOLOGY:  [ ] Reviewed and interpreted by me    EKG:

## 2018-09-01 LAB
-  AMIKACIN: SIGNIFICANT CHANGE UP
-  AMPICILLIN/SULBACTAM: SIGNIFICANT CHANGE UP
-  AMPICILLIN: SIGNIFICANT CHANGE UP
-  AZTREONAM: SIGNIFICANT CHANGE UP
-  CEFAZOLIN: SIGNIFICANT CHANGE UP
-  CEFEPIME: SIGNIFICANT CHANGE UP
-  CEFOXITIN: SIGNIFICANT CHANGE UP
-  CEFTAZIDIME: SIGNIFICANT CHANGE UP
-  CEFTRIAXONE: SIGNIFICANT CHANGE UP
-  CIPROFLOXACIN: SIGNIFICANT CHANGE UP
-  ERTAPENEM: SIGNIFICANT CHANGE UP
-  GENTAMICIN: SIGNIFICANT CHANGE UP
-  IMIPENEM: SIGNIFICANT CHANGE UP
-  LEVOFLOXACIN: SIGNIFICANT CHANGE UP
-  MEROPENEM: SIGNIFICANT CHANGE UP
-  PIPERACILLIN/TAZOBACTAM: SIGNIFICANT CHANGE UP
-  TIGECYCLINE: SIGNIFICANT CHANGE UP
-  TOBRAMYCIN: SIGNIFICANT CHANGE UP
-  TRIMETHOPRIM/SULFAMETHOXAZOLE: SIGNIFICANT CHANGE UP
BACTERIA BLD CULT: SIGNIFICANT CHANGE UP
BACTERIA BLD CULT: SIGNIFICANT CHANGE UP
E COLI DNA BLD POS QL NAA+NON-PROBE: SIGNIFICANT CHANGE UP
METHOD TYPE: SIGNIFICANT CHANGE UP

## 2018-09-07 ENCOUNTER — APPOINTMENT (OUTPATIENT)
Dept: MRI IMAGING | Facility: IMAGING CENTER | Age: 58
End: 2018-09-07

## 2019-02-20 ENCOUNTER — APPOINTMENT (OUTPATIENT)
Dept: ORTHOPEDIC SURGERY | Facility: CLINIC | Age: 59
End: 2019-02-20
Payer: MEDICAID

## 2019-02-20 VITALS
HEIGHT: 66 IN | DIASTOLIC BLOOD PRESSURE: 121 MMHG | SYSTOLIC BLOOD PRESSURE: 167 MMHG | BODY MASS INDEX: 29.09 KG/M2 | WEIGHT: 181 LBS | HEART RATE: 78 BPM

## 2019-02-20 DIAGNOSIS — Z78.9 OTHER SPECIFIED HEALTH STATUS: ICD-10-CM

## 2019-02-20 DIAGNOSIS — M06.9 RHEUMATOID ARTHRITIS, UNSPECIFIED: ICD-10-CM

## 2019-02-20 DIAGNOSIS — M17.10 UNILATERAL PRIMARY OSTEOARTHRITIS, UNSPECIFIED KNEE: ICD-10-CM

## 2019-02-20 DIAGNOSIS — M19.049 PRIMARY OSTEOARTHRITIS, UNSPECIFIED HAND: ICD-10-CM

## 2019-02-20 DIAGNOSIS — M17.5 OTHER UNILATERAL SECONDARY OSTEOARTHRITIS OF KNEE: ICD-10-CM

## 2019-02-20 PROCEDURE — 99214 OFFICE O/P EST MOD 30 MIN: CPT

## 2019-02-20 PROCEDURE — 73130 X-RAY EXAM OF HAND: CPT | Mod: RT

## 2019-02-20 PROCEDURE — 73562 X-RAY EXAM OF KNEE 3: CPT | Mod: RT

## 2019-02-20 PROCEDURE — 73110 X-RAY EXAM OF WRIST: CPT | Mod: RT

## 2019-02-20 RX ORDER — CHROMIUM 200 MCG
TABLET ORAL
Refills: 0 | Status: ACTIVE | COMMUNITY

## 2019-02-20 RX ORDER — TELMISARTAN 20 MG/1
TABLET ORAL
Refills: 0 | Status: ACTIVE | COMMUNITY

## 2019-02-20 NOTE — DISCUSSION/SUMMARY
[de-identified] : Patient and her daughter were educated about today's findings and diagnosis of severe right wrist rheumatoid arthritic changes, as well as severe end-stage arthritis of the right knee.  We discussed various treatment options for the patient's knee pain, she is a retried oral medications, oral NSAIDs, rheumatological medications, physical therapy, activity modification, cortisone injections, and has had no relief from any of these conservative therapies. Patient and her daughter are not interested in any further injections or conservative management, at this time recommend surgical consultation with one of my total knee replacement surgeons.\par Patient advised that she has severe end-stage rheumatoid arthritic changes of the wrist. Would not recommend cortisone injection as it would not likely alleviate much pain. Patient can try other conservative measures including physical therapy. She states the wrist pain is only mild at this time and intermittent. If it becomes more persistent or more severe would recommend followup with one of my hand/wrist surgical Associates. Patient and her daughter appreciate and agree with current plan. \par \par This note was generated using dragon medical dictation software.  A reasonable effort has been made for proofreading its contents, but typos may still remain.  If there are any questions or points of clarification needed please notify my office.

## 2019-02-20 NOTE — HISTORY OF PRESENT ILLNESS
[de-identified] : Patient is here for right hand/knee pain that began several years ago. She was told that she has Rheumatoid arthritis and was being seen by a Rheumatologist and her family member states that they would give her injections for pain but they decided to stop that and they have stopped all of her rheumatoid medications as they felt they were causing more problems than helping. She has not been to Pt in many years. She does not do anything else to treat this pain. The family member is uncertain if there have been any injuries of either body part.

## 2019-02-20 NOTE — PHYSICAL EXAM
[de-identified] : Constitutional: Well-nourished, well-developed, No acute distress\par Respiratory:  Good respiratory effort, no SOB\par Lymphatic: No regional lymphadenopathy, no lymphedema\par Psychiatric: Pleasant and normal affect, alert and oriented x3\par Skin: Clean dry and intact B/L UE/LE\par Musculoskeletal: normal except where as noted in regional exam\par \par Right Hand:\par APPEARANCE:  + Moderate swelling overlying right wrist, + well-healed surgical scar over the dorsal wrist with keloid formation, + ulnar deviation of the MCP joint and non-contractile flexion deformity of all fingers\par POSITIVE TENDERNESS: Dorsal wrist, DRUJ, scapholunate, carpometacarpal joints, all MCP joints\par NONTENDER: none \par ROM: Limited and painful in CMC, MCP, and IP joints. \par RESISTIVE TESTING: NL resisted testing. \par SPECIAL TESTS: normal sensation of 1st through 5th digits, normal flex/ext, abd/add, and opposition of thumb, no triggering of fingers\par Vasc: 2+ radial pulse, normal capillary refill\par Neuro: AIN, PIN, Ulnar nerve intact to motor\par Sensation: Intact to light touch throughout\par \par Right Knee:\par APPEARANCE: + enlargement of the distal femur and proximal tibia, valgus deformity, mild swelling\par POSITIVE TENDERNESS:  Distal femur, proximal tibia, medial jt line/retinaculum, and lateral jt line/retinaculum\par NONTENDER: patellar & quadriceps tendons, MCL/LCL, ITB at the lateral femoral condyle & Gerdy's tubercle, pes bursa. \par ROM: full extension, limited flexion to 110° due to stiffness and pain. \par RESISTIVE TESTING: painless resisted knee flex/ext, although + crepitus felt in anterior knee. \par SPECIAL TESTS: stable v/v stress. painless grind. neg ant/post drawer. + Lawson's for pain in medial and lateral joint line. \par NEURO: Normal sensation of LE, DTRs 2+/4 patella and achilles\par PULSES: 2+ DP/PT pulses\par  [de-identified] : The following radiographs were ordered and read by me during this patient's visit. I reviewed each radiograph in detail with the patient and discussed the findings as highlighted below. \par \par 3 views of the right wrist were obtained today that show degenerative changes and evidence of severe osteoarthritis in the DRUJ, CMC joints, and MCP joints.  No fracture, or dislocation seen at this time. There are large calcific deposit seen off the ulnar styloid, and radial styloid processes, possible all the fractures, possible rheumatoid degenerative change. No other obvious osseous abnormality. Otherwise unremarkable.\par \par 3 views of the right knee were obtained today that show degenerative changes and evidence of severe end-stage tricompartmental osteoarthritis.  No fracture, or dislocation seen at this time. There is no malalignment. No other obvious osseous abnormality. Otherwise unremarkable.

## 2019-02-20 NOTE — REASON FOR VISIT
[Initial Visit] : an initial visit for [Family Member] : family member [FreeTextEntry2] : right hand/knee pain

## 2019-02-22 ENCOUNTER — APPOINTMENT (OUTPATIENT)
Dept: ORTHOPEDIC SURGERY | Facility: CLINIC | Age: 59
End: 2019-02-22

## 2019-02-27 ENCOUNTER — APPOINTMENT (OUTPATIENT)
Dept: ORTHOPEDIC SURGERY | Facility: CLINIC | Age: 59
End: 2019-02-27

## 2020-12-18 NOTE — ED ADULT NURSE NOTE - PRIMARY CARE PROVIDER
My findings and recommendations are based on the patient's symptoms, exam, diagnostic testing and records. unk

## 2021-11-10 NOTE — ED ADULT TRIAGE NOTE - CHIEF COMPLAINT QUOTE
Pt present from [address] c/o abdominal pain and back pain. Pt tachypneic, wheezing on upper left lobe, hypotensive and febrile. [FreeTextEntry1] : Pneumonia\par supportive care\par Zithromax for 5 days\par f/u if any increase in respiratory effort. Pt present from [address] c/o abdominal pain and back pain. Pt tachypneic, wheezing on upper left lobe, hypotensive and febrile. 2 duo nebs given. Pt present from [address] c/o abdominal pain and back pain. Pt tachypneic, wheezing on upper left lobe, hypotensive in field 72/58 and febrile. 2 duo nebs given.

## 2022-05-10 NOTE — PROGRESS NOTE ADULT - PROBLEM SELECTOR PROBLEM 7
OLEG (acute kidney injury)
Radha Lira(PA)
OLEG (acute kidney injury)
DISPLAY PLAN FREE TEXT

## 2023-03-29 NOTE — PROVIDER CONTACT NOTE (CRITICAL VALUE NOTIFICATION) - RECOMMENDATIONS
Outgoing Call:  3/29/2023    [facility] called pt to discuss status of evaluation which was to take place on 3/27  Pt informed she did have a phone interview however SC will be visiting her in the home today  Pt informed she will update [facility] on hours she will be approved for  [facility] informed pt SC will have two weeks to complete this process however can be completed within a week  Pt thanked [facility] for the call  [facility] to f/u  Next outreach is scheduled for 4/5/2023  Incoming Call:  [facility] received a call from pt  Pt states she did meet with her SC, Bouchra from [facility]  Pt states SC informed her she will know how many waiver hours she will be approved for by Friday   Pt agreed to call once she hears back from [facility]  pt already on IV abx. Safety mantained,will continue to monitor.

## 2023-07-12 NOTE — ED ADULT NURSE NOTE - NS ED NOTE  TALK SOMEONE YN
Home Oxygen Evaluation Performed Per Physician's Order.       07/12/23 1210 07/12/23 1220 07/12/23 1235   Oxygen Therapy/Pulse Ox   O2 Device (S)  Nasal cannula  --   --    O2 Flow Rate (L/min) (S)  1 L/min  --   --    SpO2 (S)  93 %  --   --    Exercise (Home) O2 Eval   Liter Flow (S)  1 (S)  2 (S)  3   Regulator Continuous Flow Continuous Flow Continuous Flow   Heart rate at rest 58 beats/min 57 beats/min 57 beats/min   SpO2 at rest 93 % 93 % 94 %   Heart rate during activity 59 beats/min 58 beats/min 59 beats/min   SpO2 during activity (S)  86 % (S)  88 % (S)  92 %   Heart rate after activity 59 beats/min 58 beats/min 59 beats/min   SpO2 after activity 93 % 94 % 94 %   Distance (feet) 100 ft 100 ft 200 ft   Tolerance Exertional Dyspnea Exertional Dyspnea Exertional Dyspnea   $ Exercise O2 procedure complete (Pulmonary Stress Test) ([place] Only) Procedure Complete Procedure Complete Procedure Complete      07/12/23 1253   Oxygen Therapy/Pulse Ox   O2 Device (S)  Nasal cannula   O2 Flow Rate (L/min) (S)  1 L/min   SpO2 (S)  93 %   Exercise (Home) O2 Eval   Liter Flow  --    Regulator  --    Heart rate at rest  --    SpO2 at rest  --    Heart rate during activity  --    SpO2 during activity  --    Heart rate after activity  --    SpO2 after activity  --    Distance (feet)  --    Tolerance  --    $ Exercise O2 procedure complete (Pulmonary Stress Test) ([place] Only)  --      As above, a room air trial was performed.  While awake, at rest, and on room air, the patient's oxygen saturation by pulse oximetry was 86%.  The patient was then placed back on oxygen and an exercise, home, oxygen evaluation was performed.    During activity, the patient required supplemental oxygen at 3 L/min via NC to maintain an oxygen saturation greater than 90%.      Recommendations    While at rest, the patient requires continuous oxygen at 1 L/min via nasal cannula.  During periods of activity, however, the patient requires continuous oxygen at 3  L/min via nasal cannula.     No

## 2024-05-02 NOTE — ED ADULT NURSE NOTE - NSHISCREENINGQ1_ED_A_ED
The patient has been examined and the H&P has been reviewed:    I concur with the findings and no changes have occurred since H&P was written.    Patient seen and examined. TBili trending down. Still with some RUQ abdominal pain. Discussed proceeding to OR for robotic/lap vs open cholecystectomy and she is amenable to this.  -All risks, benefits and alternatives fully explained to patient. Risks include, but are not limited to, bleeding, infection, common bile duct injury, cystic duct stump leak, damage to ureter, damage to other intra-abdominal organs such as colon, rectum, small bowel, stomach, liver, bladder, reproductive organs, sexual dysfunction, urinary dysfunction, postoperative abscess, conversion to open operation, perioperative MI, CVA and death.  All questions field and appropriately answered to patient's satisfaction.  Consent signed and placed on chart.    Surgery risks, benefits and alternative options discussed and understood by patient/family.      Active Hospital Problems    Diagnosis  POA    Calculus of gallbladder with acute cholecystitis and obstruction [K80.01]  Yes    Elevated liver function tests [R79.89]  Yes      Resolved Hospital Problems   No resolved problems to display.      No

## 2024-09-18 ENCOUNTER — NON-APPOINTMENT (OUTPATIENT)
Age: 64
End: 2024-09-18

## 2024-09-19 ENCOUNTER — APPOINTMENT (OUTPATIENT)
Dept: SURGERY | Facility: CLINIC | Age: 64
End: 2024-09-19
Payer: COMMERCIAL

## 2024-09-19 VITALS
OXYGEN SATURATION: 98 % | BODY MASS INDEX: 29.45 KG/M2 | DIASTOLIC BLOOD PRESSURE: 74 MMHG | HEART RATE: 106 BPM | SYSTOLIC BLOOD PRESSURE: 149 MMHG | HEIGHT: 60 IN | WEIGHT: 150 LBS

## 2024-09-19 DIAGNOSIS — Z86.39 PERSONAL HISTORY OF OTHER ENDOCRINE, NUTRITIONAL AND METABOLIC DISEASE: ICD-10-CM

## 2024-09-19 DIAGNOSIS — K80.20 CALCULUS OF GALLBLADDER W/OUT CHOLECYSTITIS W/OUT OBSTRUCTION: ICD-10-CM

## 2024-09-19 DIAGNOSIS — Z87.39 PERSONAL HISTORY OF OTHER DISEASES OF THE MUSCULOSKELETAL SYSTEM AND CONNECTIVE TISSUE: ICD-10-CM

## 2024-09-19 DIAGNOSIS — M19.049 PRIMARY OSTEOARTHRITIS, UNSPECIFIED HAND: ICD-10-CM

## 2024-09-19 DIAGNOSIS — E78.00 PURE HYPERCHOLESTEROLEMIA, UNSPECIFIED: ICD-10-CM

## 2024-09-19 DIAGNOSIS — M17.10 UNILATERAL PRIMARY OSTEOARTHRITIS, UNSPECIFIED KNEE: ICD-10-CM

## 2024-09-19 DIAGNOSIS — E11.628 TYPE 2 DIABETES MELLITUS WITH OTHER SKIN COMPLICATIONS: ICD-10-CM

## 2024-09-19 DIAGNOSIS — Z63.4 DISAPPEARANCE AND DEATH OF FAMILY MEMBER: ICD-10-CM

## 2024-09-19 DIAGNOSIS — Z78.9 OTHER SPECIFIED HEALTH STATUS: ICD-10-CM

## 2024-09-19 DIAGNOSIS — K80.50 CALCULUS OF BILE DUCT W/OUT CHOLANGITIS OR CHOLECYSTITIS W/OUT OBSTRUCTION: ICD-10-CM

## 2024-09-19 DIAGNOSIS — I10 ESSENTIAL (PRIMARY) HYPERTENSION: ICD-10-CM

## 2024-09-19 PROCEDURE — 99204 OFFICE O/P NEW MOD 45 MIN: CPT

## 2024-09-19 RX ORDER — SITAGLIPTIN AND METFORMIN HYDROCHLORIDE 50; 1000 MG/1; MG/1
TABLET, FILM COATED ORAL
Refills: 0 | Status: ACTIVE | COMMUNITY

## 2024-09-19 RX ORDER — AMLODIPINE BESYLATE 5 MG/1
TABLET ORAL
Refills: 0 | Status: ACTIVE | COMMUNITY

## 2024-09-19 RX ORDER — ATORVASTATIN CALCIUM 80 MG/1
TABLET, FILM COATED ORAL
Refills: 0 | Status: ACTIVE | COMMUNITY

## 2024-09-19 RX ORDER — ASPIRIN 325 MG/1
TABLET, FILM COATED ORAL
Refills: 0 | Status: ACTIVE | COMMUNITY

## 2024-09-19 RX ORDER — GLIPIZIDE 2.5 MG/1
TABLET ORAL
Refills: 0 | Status: ACTIVE | COMMUNITY

## 2024-09-19 SDOH — SOCIAL STABILITY - SOCIAL INSECURITY: DISSAPEARANCE AND DEATH OF FAMILY MEMBER: Z63.4

## 2024-09-19 NOTE — PHYSICAL EXAM
[Abdominal Masses] : No abdominal masses [Abdomen Tenderness] : ~T ~M Abdominal tenderness [No HSM] : no hepatosplenomegaly [Tender] : was nontender [Enlarged] : not enlarged [No Rash or Lesion] : No rash or lesion [Alert] : alert [Oriented to Person] : oriented to person [Oriented to Place] : oriented to place [Oriented to Time] : oriented to time [Calm] : calm [de-identified] : In no acute distress [de-identified] : soft non tender non distended, no scars or prior surgeries, mild epigastric pain discomfort, no umbilical hernia

## 2024-09-19 NOTE — HISTORY OF PRESENT ILLNESS
[de-identified] : 64F with hx of DM, HTN HLD, presenting with recent hx of abdominal and epigastric pain, see at [facility] a few weeks ago, she was diagnosed with constipation and sent home after reassuring xrays. In addition she was seen by PCP who ordered a sonogram report showing gallstones but no evidence of cholecystitis at that time. She was referred by her PCP for consideration of cholecystectomy. She does not claim any recent fevers or chills, nausea or vomiting, only mild to moderate epigastric pain, specifically after eating fatty meals

## 2024-09-19 NOTE — ASSESSMENT
[FreeTextEntry1] : 64F with symptomatic cholelithiasis  - All risks and benefits were explained regarding Robotic Cholecystectomy  - patient agreed to proceed with surgery  - will need medical clearance, forms sent to PCP

## 2024-09-19 NOTE — DATA REVIEWED
[FreeTextEntry1] : Exam requested by: JUSTO BETHEA MD [id number] [facility] [address] Patient: BRYCE GRIFFIN YOB: 1960 Phone: [phone] MRN: [id number] Acc: [id number] Date of Exam: 06- EXAM:  ULTRASOUND ABDOMEN COMPLETE HISTORY:  Female, 64 years-old with abdominal pain TECHNIQUE:  Using real-time ultrasonography with a high-resolution broadband phased-array curved transducer,  multiplanar gray scale images were obtained and supplemented with color Doppler. Static images are provided for  review. COMPARISON:  None FINDINGS:   Abdominal Aorta/IVC: No evidence of abdominal aortic aneurysm. Visualized portions of the IVC were patent. Liver:  Normal in size, morphology and contour. The visualized portion of portal and hepatic veins are unremarkable. No  intrahepatic biliary duct dilatation. The liver has normal echogenicity. Gallbladder: There is a wall echo shadow sign at the gallbladder fossa. This shadow obscures any other findings at the  gallbladder fossa. Common Duct: Normal measuring 0.3 cm diameter at the eb hepatis. Pancreas: The visualized portion of the body of the pancreas is within normal limits. The tail is obscured by overlying  bowel gas. No pancreatic duct dilatation. Kidneys: Normal in size, morphology and cortical echotexture. There is no suspicious renal lesion. A right lower pole  simple renal cyst measures 0.9 x 0.7 x 0.9 cm. There is mild fullness of the right intrarenal collecting system. No shadowing calculi or perinephric fluid. Right Kidney: 8.4 cm in length. Left Kidney:   10.7 cm in length. Spleen: Normal size, contour and echotexture measuring 7.8 cm in length. IMPRESSION: 1. Wall echo shadow sign in the expected location of the gallbladder suggests a gallbladder filled with calculi. SITE PERFORMED: [place] [facility] PHONE: [phone] Confidential Printed: 09- 7:03 PM BRYCE GRIFFIN (Exam: 06- 11:45 AM) Page 1 of 2 Tel: [phone] - Fax: [phone] - [email or web address] Continued: Page 2 of 2 Patient: BRYCE GRIFFIN YOB: 1960 2. Mild fullness of the right intrarenal collecting system. Correlation with CT urogram may be considered to evaluate for  obstructing lesion. 3. Right renal simple cyst, for which no follow-up imaging is necessary. Thank you for the opportunity to participate in the care of this patient.  ISIDORO JORDAN MD - Electronically Signed: 07- 7:06 PM Physician to Physician Direct Line is: [phone]